# Patient Record
Sex: FEMALE | Race: BLACK OR AFRICAN AMERICAN | Employment: UNEMPLOYED | URBAN - METROPOLITAN AREA
[De-identification: names, ages, dates, MRNs, and addresses within clinical notes are randomized per-mention and may not be internally consistent; named-entity substitution may affect disease eponyms.]

---

## 2022-11-10 ENCOUNTER — ANESTHESIA EVENT (OUTPATIENT)
Dept: LABOR AND DELIVERY | Age: 27
End: 2022-11-10
Payer: COMMERCIAL

## 2022-11-10 ENCOUNTER — ANESTHESIA (OUTPATIENT)
Dept: LABOR AND DELIVERY | Age: 27
End: 2022-11-10
Payer: COMMERCIAL

## 2022-11-10 ENCOUNTER — HOSPITAL ENCOUNTER (OUTPATIENT)
Age: 27
Discharge: HOME | End: 2022-11-10
Attending: OBSTETRICS & GYNECOLOGY | Admitting: OBSTETRICS & GYNECOLOGY
Payer: COMMERCIAL

## 2022-11-10 VITALS
OXYGEN SATURATION: 100 % | HEART RATE: 68 BPM | RESPIRATION RATE: 18 BRPM | SYSTOLIC BLOOD PRESSURE: 103 MMHG | DIASTOLIC BLOOD PRESSURE: 58 MMHG | HEIGHT: 70 IN | BODY MASS INDEX: 23.91 KG/M2 | WEIGHT: 167 LBS | TEMPERATURE: 98 F

## 2022-11-10 DIAGNOSIS — O02.1 MISSED ABORTION: ICD-10-CM

## 2022-11-10 DIAGNOSIS — G89.18 ACUTE POST-OPERATIVE PAIN: Primary | ICD-10-CM

## 2022-11-10 LAB
ABO/RH: NORMAL
ANTIBODY SCREEN: NORMAL
BASOPHILS ABSOLUTE: 0 K/UL (ref 0–0.2)
BASOPHILS RELATIVE PERCENT: 0.6 %
EOSINOPHILS ABSOLUTE: 0.1 K/UL (ref 0–0.6)
EOSINOPHILS RELATIVE PERCENT: 1.6 %
HCT VFR BLD CALC: 39.9 % (ref 36–48)
HEMOGLOBIN: 12.8 G/DL (ref 12–16)
LYMPHOCYTES ABSOLUTE: 3.4 K/UL (ref 1–5.1)
LYMPHOCYTES RELATIVE PERCENT: 48.5 %
MCH RBC QN AUTO: 26.3 PG (ref 26–34)
MCHC RBC AUTO-ENTMCNC: 32.2 G/DL (ref 31–36)
MCV RBC AUTO: 81.6 FL (ref 80–100)
MONOCYTES ABSOLUTE: 0.6 K/UL (ref 0–1.3)
MONOCYTES RELATIVE PERCENT: 7.8 %
NEUTROPHILS ABSOLUTE: 2.9 K/UL (ref 1.7–7.7)
NEUTROPHILS RELATIVE PERCENT: 41.5 %
PDW BLD-RTO: 14 % (ref 12.4–15.4)
PLATELET # BLD: 291 K/UL (ref 135–450)
PMV BLD AUTO: 8.5 FL (ref 5–10.5)
RBC # BLD: 4.89 M/UL (ref 4–5.2)
WBC # BLD: 7.1 K/UL (ref 4–11)

## 2022-11-10 PROCEDURE — 2580000003 HC RX 258: Performed by: OBSTETRICS & GYNECOLOGY

## 2022-11-10 PROCEDURE — 3700000001 HC ADD 15 MINUTES (ANESTHESIA): Performed by: OBSTETRICS & GYNECOLOGY

## 2022-11-10 PROCEDURE — 6360000002 HC RX W HCPCS: Performed by: ANESTHESIOLOGY

## 2022-11-10 PROCEDURE — 86901 BLOOD TYPING SEROLOGIC RH(D): CPT

## 2022-11-10 PROCEDURE — 2709999900 HC NON-CHARGEABLE SUPPLY: Performed by: OBSTETRICS & GYNECOLOGY

## 2022-11-10 PROCEDURE — 86850 RBC ANTIBODY SCREEN: CPT

## 2022-11-10 PROCEDURE — 2580000003 HC RX 258: Performed by: ANESTHESIOLOGY

## 2022-11-10 PROCEDURE — 3600000012 HC SURGERY LEVEL 2 ADDTL 15MIN: Performed by: OBSTETRICS & GYNECOLOGY

## 2022-11-10 PROCEDURE — 6360000002 HC RX W HCPCS: Performed by: NURSE ANESTHETIST, CERTIFIED REGISTERED

## 2022-11-10 PROCEDURE — 86900 BLOOD TYPING SEROLOGIC ABO: CPT

## 2022-11-10 PROCEDURE — 6370000000 HC RX 637 (ALT 250 FOR IP): Performed by: ANESTHESIOLOGY

## 2022-11-10 PROCEDURE — 2500000003 HC RX 250 WO HCPCS: Performed by: OBSTETRICS & GYNECOLOGY

## 2022-11-10 PROCEDURE — 51701 INSERT BLADDER CATHETER: CPT

## 2022-11-10 PROCEDURE — 7100000001 HC PACU RECOVERY - ADDTL 15 MIN: Performed by: OBSTETRICS & GYNECOLOGY

## 2022-11-10 PROCEDURE — 3700000000 HC ANESTHESIA ATTENDED CARE: Performed by: OBSTETRICS & GYNECOLOGY

## 2022-11-10 PROCEDURE — 2500000003 HC RX 250 WO HCPCS: Performed by: ANESTHESIOLOGY

## 2022-11-10 PROCEDURE — 88305 TISSUE EXAM BY PATHOLOGIST: CPT

## 2022-11-10 PROCEDURE — 7100000000 HC PACU RECOVERY - FIRST 15 MIN: Performed by: OBSTETRICS & GYNECOLOGY

## 2022-11-10 PROCEDURE — 85025 COMPLETE CBC W/AUTO DIFF WBC: CPT

## 2022-11-10 PROCEDURE — A4216 STERILE WATER/SALINE, 10 ML: HCPCS | Performed by: ANESTHESIOLOGY

## 2022-11-10 PROCEDURE — 3600000002 HC SURGERY LEVEL 2 BASE: Performed by: OBSTETRICS & GYNECOLOGY

## 2022-11-10 PROCEDURE — 2500000003 HC RX 250 WO HCPCS: Performed by: NURSE ANESTHETIST, CERTIFIED REGISTERED

## 2022-11-10 RX ORDER — SODIUM CHLORIDE, SODIUM LACTATE, POTASSIUM CHLORIDE, CALCIUM CHLORIDE 600; 310; 30; 20 MG/100ML; MG/100ML; MG/100ML; MG/100ML
INJECTION, SOLUTION INTRAVENOUS CONTINUOUS
Status: DISCONTINUED | OUTPATIENT
Start: 2022-11-10 | End: 2022-11-10 | Stop reason: HOSPADM

## 2022-11-10 RX ORDER — SODIUM CHLORIDE, SODIUM LACTATE, POTASSIUM CHLORIDE, AND CALCIUM CHLORIDE .6; .31; .03; .02 G/100ML; G/100ML; G/100ML; G/100ML
1000 INJECTION, SOLUTION INTRAVENOUS ONCE
Status: COMPLETED | OUTPATIENT
Start: 2022-11-10 | End: 2022-11-10

## 2022-11-10 RX ORDER — FENTANYL CITRATE 50 UG/ML
INJECTION, SOLUTION INTRAMUSCULAR; INTRAVENOUS PRN
Status: DISCONTINUED | OUTPATIENT
Start: 2022-11-10 | End: 2022-11-10 | Stop reason: SDUPTHER

## 2022-11-10 RX ORDER — KETOROLAC TROMETHAMINE 30 MG/ML
INJECTION, SOLUTION INTRAMUSCULAR; INTRAVENOUS PRN
Status: DISCONTINUED | OUTPATIENT
Start: 2022-11-10 | End: 2022-11-10 | Stop reason: SDUPTHER

## 2022-11-10 RX ORDER — HYDROCODONE BITARTRATE AND ACETAMINOPHEN 5; 325 MG/1; MG/1
1 TABLET ORAL EVERY 6 HOURS PRN
Qty: 12 TABLET | Refills: 0 | Status: SHIPPED | OUTPATIENT
Start: 2022-11-10 | End: 2022-11-13

## 2022-11-10 RX ORDER — SODIUM CHLORIDE 0.9 % (FLUSH) 0.9 %
5-40 SYRINGE (ML) INJECTION PRN
Status: DISCONTINUED | OUTPATIENT
Start: 2022-11-10 | End: 2022-11-10 | Stop reason: HOSPADM

## 2022-11-10 RX ORDER — ACETAMINOPHEN 500 MG
1000 TABLET ORAL
Status: COMPLETED | OUTPATIENT
Start: 2022-11-10 | End: 2022-11-10

## 2022-11-10 RX ORDER — PROPOFOL 10 MG/ML
INJECTION, EMULSION INTRAVENOUS PRN
Status: DISCONTINUED | OUTPATIENT
Start: 2022-11-10 | End: 2022-11-10 | Stop reason: SDUPTHER

## 2022-11-10 RX ORDER — LIDOCAINE HYDROCHLORIDE 20 MG/ML
INJECTION, SOLUTION EPIDURAL; INFILTRATION; INTRACAUDAL; PERINEURAL PRN
Status: DISCONTINUED | OUTPATIENT
Start: 2022-11-10 | End: 2022-11-10 | Stop reason: SDUPTHER

## 2022-11-10 RX ORDER — DOCUSATE SODIUM 100 MG/1
100 CAPSULE, LIQUID FILLED ORAL DAILY PRN
Qty: 30 CAPSULE | Refills: 0 | Status: SHIPPED | OUTPATIENT
Start: 2022-11-10

## 2022-11-10 RX ORDER — FENTANYL CITRATE 50 UG/ML
50 INJECTION, SOLUTION INTRAMUSCULAR; INTRAVENOUS ONCE
Status: COMPLETED | OUTPATIENT
Start: 2022-11-10 | End: 2022-11-10

## 2022-11-10 RX ORDER — SODIUM CHLORIDE 9 MG/ML
INJECTION, SOLUTION INTRAVENOUS PRN
Status: DISCONTINUED | OUTPATIENT
Start: 2022-11-10 | End: 2022-11-10 | Stop reason: HOSPADM

## 2022-11-10 RX ORDER — MIDAZOLAM HYDROCHLORIDE 1 MG/ML
INJECTION INTRAMUSCULAR; INTRAVENOUS PRN
Status: DISCONTINUED | OUTPATIENT
Start: 2022-11-10 | End: 2022-11-10 | Stop reason: SDUPTHER

## 2022-11-10 RX ORDER — TRISODIUM CITRATE DIHYDRATE AND CITRIC ACID MONOHYDRATE 500; 334 MG/5ML; MG/5ML
30 SOLUTION ORAL
Status: COMPLETED | OUTPATIENT
Start: 2022-11-10 | End: 2022-11-10

## 2022-11-10 RX ORDER — ONDANSETRON 2 MG/ML
INJECTION INTRAMUSCULAR; INTRAVENOUS PRN
Status: DISCONTINUED | OUTPATIENT
Start: 2022-11-10 | End: 2022-11-10 | Stop reason: SDUPTHER

## 2022-11-10 RX ORDER — SODIUM CHLORIDE 0.9 % (FLUSH) 0.9 %
5-40 SYRINGE (ML) INJECTION EVERY 12 HOURS SCHEDULED
Status: DISCONTINUED | OUTPATIENT
Start: 2022-11-10 | End: 2022-11-10 | Stop reason: HOSPADM

## 2022-11-10 RX ORDER — DEXAMETHASONE SODIUM PHOSPHATE 4 MG/ML
INJECTION, SOLUTION INTRA-ARTICULAR; INTRALESIONAL; INTRAMUSCULAR; INTRAVENOUS; SOFT TISSUE PRN
Status: DISCONTINUED | OUTPATIENT
Start: 2022-11-10 | End: 2022-11-10 | Stop reason: SDUPTHER

## 2022-11-10 RX ADMIN — FENTANYL CITRATE 50 MCG: 50 INJECTION INTRAMUSCULAR; INTRAVENOUS at 09:12

## 2022-11-10 RX ADMIN — SODIUM CHLORIDE, POTASSIUM CHLORIDE, SODIUM LACTATE AND CALCIUM CHLORIDE 1000 ML: 600; 310; 30; 20 INJECTION, SOLUTION INTRAVENOUS at 07:56

## 2022-11-10 RX ADMIN — ACETAMINOPHEN 1000 MG: 500 TABLET ORAL at 07:52

## 2022-11-10 RX ADMIN — SODIUM CHLORIDE, SODIUM LACTATE, POTASSIUM CHLORIDE, AND CALCIUM CHLORIDE: .6; .31; .03; .02 INJECTION, SOLUTION INTRAVENOUS at 08:57

## 2022-11-10 RX ADMIN — HYDROMORPHONE HYDROCHLORIDE 1 MG: 1 INJECTION, SOLUTION INTRAMUSCULAR; INTRAVENOUS; SUBCUTANEOUS at 12:04

## 2022-11-10 RX ADMIN — MIDAZOLAM 1 MG: 1 INJECTION INTRAMUSCULAR; INTRAVENOUS at 08:57

## 2022-11-10 RX ADMIN — LIDOCAINE HYDROCHLORIDE 50 MG: 20 INJECTION, SOLUTION EPIDURAL; INFILTRATION; INTRACAUDAL; PERINEURAL at 09:07

## 2022-11-10 RX ADMIN — SODIUM CITRATE AND CITRIC ACID MONOHYDRATE 30 ML: 500; 334 SOLUTION ORAL at 07:53

## 2022-11-10 RX ADMIN — KETOROLAC TROMETHAMINE 30 MG: 30 INJECTION, SOLUTION INTRAMUSCULAR at 09:50

## 2022-11-10 RX ADMIN — FAMOTIDINE 20 MG: 10 INJECTION, SOLUTION INTRAVENOUS at 07:53

## 2022-11-10 RX ADMIN — ONDANSETRON 4 MG: 2 INJECTION INTRAMUSCULAR; INTRAVENOUS at 09:42

## 2022-11-10 RX ADMIN — DOXYCYCLINE 200 MG: 100 INJECTION, POWDER, LYOPHILIZED, FOR SOLUTION INTRAVENOUS at 08:00

## 2022-11-10 RX ADMIN — FENTANYL CITRATE 50 MCG: 50 INJECTION INTRAMUSCULAR; INTRAVENOUS at 09:00

## 2022-11-10 RX ADMIN — FENTANYL CITRATE 50 MCG: 50 INJECTION, SOLUTION INTRAMUSCULAR; INTRAVENOUS at 10:46

## 2022-11-10 RX ADMIN — PROPOFOL 200 MG: 10 INJECTION, EMULSION INTRAVENOUS at 09:07

## 2022-11-10 RX ADMIN — DEXAMETHASONE SODIUM PHOSPHATE 8 MG: 4 INJECTION, SOLUTION INTRAMUSCULAR; INTRAVENOUS at 09:20

## 2022-11-10 ASSESSMENT — PAIN - FUNCTIONAL ASSESSMENT: PAIN_FUNCTIONAL_ASSESSMENT: ACTIVITIES ARE NOT PREVENTED

## 2022-11-10 ASSESSMENT — PAIN DESCRIPTION - ORIENTATION: ORIENTATION: LOWER

## 2022-11-10 ASSESSMENT — PAIN DESCRIPTION - DESCRIPTORS
DESCRIPTORS: CRAMPING
DESCRIPTORS: CRAMPING;SHOOTING

## 2022-11-10 ASSESSMENT — PAIN SCALES - GENERAL
PAINLEVEL_OUTOF10: 7
PAINLEVEL_OUTOF10: 7

## 2022-11-10 ASSESSMENT — PAIN DESCRIPTION - LOCATION: LOCATION: ABDOMEN

## 2022-11-10 ASSESSMENT — ENCOUNTER SYMPTOMS: SHORTNESS OF BREATH: 0

## 2022-11-10 NOTE — FLOWSHEET NOTE
Pt admitted to [facility] for scheduled D&C procedure . Pt and family oriented to room, call light, and binder. Whiteboard updated, gown and urine collection container given. Pt denies vaginal leakage of fluid or bleeding. IV started, infusing w/o difficulty, labs sent. Admission history obtained and appropriate consents reviewed and signed.

## 2022-11-10 NOTE — PLAN OF CARE
Problem: Discharge Planning  Goal: Discharge to home or other facility with appropriate resources  Outcome: Progressing     Problem: Pain  Goal: Verbalizes/displays adequate comfort level or baseline comfort level  Outcome: Progressing     Problem: Postpartum  Goal: Incisions, wounds, or drain sites healing without S/S of infection  Outcome: Progressing     Problem: Infection - Adult  Goal: Absence of infection at discharge  Outcome: Progressing  Goal: Absence of infection during hospitalization  Outcome: Progressing  Goal: Absence of fever/infection during anticipated neutropenic period  Outcome: Progressing     Problem: Safety - Adult  Goal: Free from fall injury  Outcome: Progressing

## 2022-11-10 NOTE — FLOWSHEET NOTE
Patient complains of shooting lower back pain.  Patient rates that pain 7/10 despite recent does of fentanyl

## 2022-11-10 NOTE — FLOWSHEET NOTE
Anora box packaged and given to  of patient and instructed to take to [facility] for shipping.

## 2022-11-10 NOTE — ANESTHESIA POSTPROCEDURE EVALUATION
Department of Anesthesiology  Postprocedure Note    Patient: Maudie Phoenix  MRN: [id number]  YOB: 1995  Date of evaluation: 11/10/2022      Procedure Summary     Date: 11/10/22 Room / Location: [facility]D OR [address] [place] [address]    Anesthesia Start: 2796 Anesthesia Stop: 1007    Procedure: DILATATION AND CURETTAGE SUCTION with  Juan Rangel MD Diagnosis:       Missed       (Missed  [O02.1])    Surgeons: Juan Rangel MD Responsible Provider: Jyoti Klein MD    Anesthesia Type: general ASA Status: 1          Anesthesia Type: No value filed.     Felix Phase I: Felix Score: 10    Felix Phase II:        Anesthesia Post Evaluation    Patient location during evaluation: bedside  Patient participation: complete - patient participated  Level of consciousness: awake and alert  Pain score: 3  Airway patency: patent  Nausea & Vomiting: no nausea and no vomiting  Complications: no  Cardiovascular status: hemodynamically stable  Respiratory status: room air and acceptable  Hydration status: stable  Multimodal analgesia pain management approach    BP (!) 103/58   Pulse 68   Temp 36.7 °C (98 °F) (Oral)   Resp 18   Ht 5' 10\" (1.778 m)   Wt 167 lb (75.8 kg)   SpO2 100%   BMI 23.96 kg/m²

## 2022-11-10 NOTE — FLOWSHEET NOTE
Tess[employer]'s gift charm with cord poem and November beaded charm given to pt. Emotional support given, pt and  calm and appropriate.

## 2022-11-10 NOTE — PLAN OF CARE
Problem: Discharge Planning  Goal: Discharge to home or other facility with appropriate resources  11/10/2022 1428 by [facility] Route, RN  Outcome: Completed  11/10/2022 1033 by [facility] Route, RN  Outcome: Progressing     Problem: Pain  Goal: Verbalizes/displays adequate comfort level or baseline comfort level  11/10/2022 1428 by [facility] Route, RN  Outcome: Completed  11/10/2022 1033 by [facility] Route, RN  Outcome: Progressing     Problem: Postpartum  Goal: Incisions, wounds, or drain sites healing without S/S of infection  11/10/2022 1428 by [facility] Route, RN  Outcome: Completed  11/10/2022 1033 by [facility] Route, RN  Outcome: Progressing     Problem: Infection - Adult  Goal: Absence of infection at discharge  11/10/2022 1428 by [facility] Route, RN  Outcome: Completed  11/10/2022 1033 by [facility] Route, RN  Outcome: Progressing  Goal: Absence of infection during hospitalization  11/10/2022 1428 by [facility] Route, RN  Outcome: Completed  11/10/2022 1033 by [facility] Route, RN  Outcome: Progressing  Goal: Absence of fever/infection during anticipated neutropenic period  11/10/2022 1428 by [facility] Route, RN  Outcome: Completed  11/10/2022 1033 by [facility] Route, RN  Outcome: Progressing     Problem: Safety - Adult  Goal: Free from fall injury  11/10/2022 1428 by [facility] Route, RN  Outcome: Completed  11/10/2022 1033 by [facility] Route, RN  Outcome: Progressing

## 2022-11-10 NOTE — FLOWSHEET NOTE
Pt doing well and ready for discharge. Discharge instructions reviewed with pt. Patient aware of when to follow up with provider in 1 month and all questions answered. Pt verbalized understanding, discharge papers signed by pt and RN. Scripts sent to preferred pharmacy , IV removed at this time- pt tolerated well. [employer] Gift charm and November bracelet given to pt and significant other, emotional support given at this time. Patient discharged via wheelchair with RN and  to vehicle.

## 2022-11-10 NOTE — H&P
Department of Gynecology History and Physical      CHIEF COMPLAINT: Missed     HISTORY OF PRESENT ILLNESS:    The patient is a 28 y.o. Elena [facility] female with significant past medical history of missed  who presents for suction D&C. Past Medical History:    History reviewed. No pertinent past medical history. Past Surgical History:    History reviewed. No pertinent surgical history. Meds:  Current Facility-Administered Medications:     lactated ringers infusion, , IntraVENous, Continuous, Giancarlo Richard MD    lactated ringers bolus, 1,000 mL, IntraVENous, Once, Giancarlo Richard MD, Last Rate: 983.6 mL/hr at 11/10/22 0756, 1,000 mL at 11/10/22 0756    doxycycline (VIBRAMYCIN) 200 mg in dextrose 5 % 250 mL IVPB, 200 mg, IntraVENous, Once, Giancarlo Richard MD     Allergies:  Patient has no known allergies.      Social History:  Social History     Socioeconomic History    Marital status:      Spouse name: None    Number of children: None    Years of education: None    Highest education level: None   Tobacco Use    Smoking status: Never     Passive exposure: Never    Smokeless tobacco: Never   Vaping Use    Vaping Use: Never used   Substance and Sexual Activity    Alcohol use: Never    Drug use: Never    Sexual activity: Never         Family History:       Problem Relation Age of Onset    Diabetes Mother     Diabetes Father        ROS:     General ROS: negative  Respiratory ROS: no cough, SOB or wheezing  Cardiovascular ROS: no chest pain or dyspnea on exertion  Gastrointestinal ROS: no abdominal pain, change in bowel habits, or black or bloody stools  Genito-Urinary ROS: no dysuria, trouble of voiding or hematuria  Musculoskeletal ROS: negative    PHYSICAL EXAM:    Vitals:  /89   Pulse 88   Temp 98.1 °F (36.7 °C) (Oral)   Resp 18   Ht 5' 10\" (1.778 m)   Wt 167 lb (75.8 kg)   SpO2 98%   BMI 23.96 kg/m²     CONSTITUTIONAL:  awake, alert, cooperative, no apparent distress, and appears stated age  NECK:  Supple, symmetrical, trachea midline, no adenopathy, thyroid symmetric, not enlarged and no tenderness, skin normal  HEMATOLOGIC/LYMPHATICS:  no cervical lymphadenopathy  BACK:  symmetric  LUNGS:  No increased work of breathing, good air exchange  CARDIOVASCULAR: No periferal edema  ABDOMEN:  Soft, non-distended, non-tender, no masses palpated, no hepatosplenomegally  MUSCULOSKELETAL:  tone is normal  NEUROLOGIC:  Awake, alert, oriented to name, place and time. SKIN:  normal skin color, texture, turgor      Labs:  CBC:   Lab Results   Component Value Date/Time    WBC 7.1 11/10/2022 07:45 AM    RBC 4.89 11/10/2022 07:45 AM    HGB 12.8 11/10/2022 07:45 AM    HCT 39.9 11/10/2022 07:45 AM    MCV 81.6 11/10/2022 07:45 AM    RDW 14.0 11/10/2022 07:45 AM     11/10/2022 07:45 AM     Imaging: not performed    ASSESSMENT AND PLAN:      Missed     - to OR for suction D&C  - procedure reviewed in the office and again today. Risks include but are not limited to bleeding, infection, damage to surrounding tissue and organs necessitating further surgeries not listed. I have presented reasonable alternatives to the patient's proposed care, treatment, and services. The discussion I have done encompassed risks, benefits, and side effects related to the alternatives and the risks related to not receiving the proposed care, treatment, and services. All questions answered. Patient wishes to proceed. The surgical site was confirmed by the patient and me.      Electronically signed by Briana Scott MD on 11/10/2022 at 8:37 AM

## 2022-11-10 NOTE — OP NOTE
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 The risks of the procedure were reviewed with the patient in details . They included but were not limited to risk of uterine perforation, that might require additional procedure, risk of bleeding and blood transfusion which carries risk for HIV or hepatitis, allergic reaction, risk of infection, risk of adhesive disease or scar formation in the uterus with subsequent infertility, risk of blood clot/embolism, risk of anesthesia, cardiac arrest, and remote risk of maternal death. Patient accepted the risks and elected to proceed with procedure. Description of Procedure: The patient was taken to the Operating Room where anesthesia was found to be adequate. Exam under anesthesia revealed anteverted uterus enlarged to 9 wks. The patient was then placed in the dorsal lithotomy position and prepped and draped in the usual sterile fashion. A straight catheter was used to drain the urine from the bladder. Time out was performed, identifying correct patients name, date of birth, and type of the procedure. The cervix and distal vagina were visualized. The cervix was not dilated. A single-toothed tenaculum clamp was used to grasp the anterior lip of the cervix. Suction curette 7 mm size was passed approximately 5 times gently, removing products of conception. A sharp curettage was performed under ultrasound guidance and then a final pass with the suction curette.  Bleeding in the canister was significant but there was never active bleeding from the cervix in between curettage. The single-toothed tenaculum was removed and sites were hemostatic. All instruments and retractors were removed from vagina. A vaginal sweep was done. All sponge, instrument and needle counts were noted to be correct.  The patient tolerated procedure well and was taken to recovery in stable

## 2022-11-10 NOTE — ANESTHESIA PRE PROCEDURE
Department of Anesthesiology  Preprocedure Note       Name:  Damon Drake   Age:  28 y.o.  :  1995                                          MRN:  [id number]         Date:  11/10/2022      Surgeon: Susana Jordan):  Ace Paz MD    Procedure: Procedure(s):  DILATATION AND CURETTAGE SUCTION    Medications prior to admission:   Prior to Admission medications    Not on File       Current medications:    Current Facility-Administered Medications   Medication Dose Route Frequency Provider Last Rate Last Admin    lactated ringers infusion   IntraVENous Continuous Ace Paz MD        lactated ringers bolus  1,000 mL IntraVENous Once Ace Paz .6 mL/hr at 11/10/22 0756 1,000 mL at 11/10/22 0756    doxycycline (VIBRAMYCIN) 200 mg in dextrose 5 % 250 mL IVPB  200 mg IntraVENous Once Ace Paz MD           Allergies:  No Known Allergies    Problem List:    Patient Active Problem List   Diagnosis Code    Missed  O02.1       Past Medical History:  History reviewed. No pertinent past medical history. Past Surgical History:  History reviewed. No pertinent surgical history. Social History:    Social History     Tobacco Use    Smoking status: Never     Passive exposure: Never    Smokeless tobacco: Never   Substance Use Topics    Alcohol use: Never                                Counseling given: No      Vital Signs (Current):   Vitals:    11/10/22 0723 11/10/22 0738   BP: 118/89    Pulse: 88 88   Resp: 18    Temp: 36.7 °C (98.1 °F)    TempSrc: Oral    SpO2: 98%    Weight:  167 lb (75.8 kg)   Height:  5' 10\" (1.778 m)                                              BP Readings from Last 3 Encounters:   11/10/22 118/89       NPO Status:                           2330 on 22                                                      BMI:   Wt Readings from Last 3 Encounters:   11/10/22 167 lb (75.8 kg)     Body mass index is 23.96 kg/m².     CBC:   Lab Results   Component Value Date/Time    WBC 7.1 11/10/2022 07:45 AM    RBC 4.89 11/10/2022 07:45 AM    HGB 12.8 11/10/2022 07:45 AM    HCT 39.9 11/10/2022 07:45 AM    MCV 81.6 11/10/2022 07:45 AM    RDW 14.0 11/10/2022 07:45 AM     11/10/2022 07:45 AM         Anesthesia Evaluation  Patient summary reviewed and Nursing notes reviewed no history of anesthetic complications:   Airway: Mallampati: II  TM distance: >3 FB   Neck ROM: full  Mouth opening: > = 3 FB   Dental: normal exam         Pulmonary:Negative Pulmonary ROS and normal exam  breath sounds clear to auscultation      (-) asthma, shortness of breath and recent URI                           Cardiovascular:Negative CV ROS  Exercise tolerance: good (>4 METS),       (-) hypertension and CAD      Rhythm: regular  Rate: normal                    Neuro/Psych:   Negative Neuro/Psych ROS              GI/Hepatic/Renal: Neg GI/Hepatic/Renal ROS       (-) GERD, liver disease and no renal disease       Endo/Other: Negative Endo/Other ROS       (-) diabetes mellitus               Abdominal:             Vascular: negative vascular ROS. - DVT and PE. Other Findings:           Anesthesia Plan      general     ASA 1     (GETA - PIV. Multimodal analgesics for pain control. PONV prophylaxis prn. R&B, POC discussed with patient - questions answered. Patient agrees to [address] PACU post-op)        Anesthetic plan and risks discussed with patient and spouse. Plan discussed with attending and surgical team.    Attending anesthesiologist reviewed and agrees with Preprocedure content          OB History        1    Para        Term                AB   1    Living           SAB   1    IAB        Ectopic        Molar        Multiple        Live Births                    Vandana Quick is a 28y.o. year-old female admitted to Pranav Varghese MD for [facility] . Her Body mass index is 23.96 kg/m².      She was seen, examined and her chart was reviewed (including anesthesia, drug and allergy history). No interval changes are noted to her history and physical examination. (except as noted above).     Risks/benefits/alternatives of both neuraxial and general anesthesia were discussed and she agrees to proceed at the direction of the care team.    Esa Langley, GERA - SCOTT  November 10, 2022  8:09 AM

## 2022-11-10 NOTE — FLOWSHEET NOTE
Dr. Gibran Ariza and CRNA at bedside to address shooting pains in lower back. Pain appears to be gas pain. Patient will try and go to bathroom. Patient states that she has a bowel movement every 2 days.

## 2022-11-16 ENCOUNTER — HOSPITAL ENCOUNTER (EMERGENCY)
Age: 27
Discharge: HOME | End: 2022-11-16
Payer: COMMERCIAL

## 2022-11-16 ENCOUNTER — APPOINTMENT (OUTPATIENT)
Dept: CT IMAGING | Age: 27
End: 2022-11-16
Payer: COMMERCIAL

## 2022-11-16 VITALS
OXYGEN SATURATION: 100 % | BODY MASS INDEX: 23.96 KG/M2 | HEART RATE: 78 BPM | DIASTOLIC BLOOD PRESSURE: 72 MMHG | RESPIRATION RATE: 14 BRPM | SYSTOLIC BLOOD PRESSURE: 114 MMHG | TEMPERATURE: 98.2 F | HEIGHT: 70 IN

## 2022-11-16 DIAGNOSIS — N93.9 VAGINAL BLEEDING: ICD-10-CM

## 2022-11-16 DIAGNOSIS — R10.30 LOWER ABDOMINAL PAIN: Primary | ICD-10-CM

## 2022-11-16 LAB
A/G RATIO: 1.2 (ref 1.1–2.2)
ALBUMIN SERPL-MCNC: 4.2 G/DL (ref 3.4–5)
ALP BLD-CCNC: 65 U/L (ref 40–129)
ALT SERPL-CCNC: 15 U/L (ref 10–40)
ANION GAP SERPL CALCULATED.3IONS-SCNC: 12 MMOL/L (ref 3–16)
AST SERPL-CCNC: 21 U/L (ref 15–37)
BACTERIA: ABNORMAL /HPF
BASOPHILS ABSOLUTE: 0 K/UL (ref 0–0.2)
BASOPHILS RELATIVE PERCENT: 0.7 %
BILIRUB SERPL-MCNC: 0.3 MG/DL (ref 0–1)
BILIRUBIN URINE: NEGATIVE
BLOOD, URINE: ABNORMAL
BUN BLDV-MCNC: 9 MG/DL (ref 7–20)
CALCIUM SERPL-MCNC: 9.4 MG/DL (ref 8.3–10.6)
CHLORIDE BLD-SCNC: 103 MMOL/L (ref 99–110)
CLARITY: CLEAR
CO2: 24 MMOL/L (ref 21–32)
COLOR: YELLOW
CREAT SERPL-MCNC: 0.5 MG/DL (ref 0.6–1.1)
EOSINOPHILS ABSOLUTE: 0.2 K/UL (ref 0–0.6)
EOSINOPHILS RELATIVE PERCENT: 3.6 %
EPITHELIAL CELLS, UA: 6 /HPF (ref 0–5)
GFR SERPL CREATININE-BSD FRML MDRD: >60 ML/MIN/{1.73_M2}
GLUCOSE BLD-MCNC: 81 MG/DL (ref 70–99)
GLUCOSE URINE: NEGATIVE MG/DL
HCG QUALITATIVE: POSITIVE
HCT VFR BLD CALC: 37.2 % (ref 36–48)
HEMOGLOBIN: 11.8 G/DL (ref 12–16)
HYALINE CASTS: 0 /LPF (ref 0–8)
KETONES, URINE: NEGATIVE MG/DL
LEUKOCYTE ESTERASE, URINE: ABNORMAL
LYMPHOCYTES ABSOLUTE: 2.1 K/UL (ref 1–5.1)
LYMPHOCYTES RELATIVE PERCENT: 36 %
MCH RBC QN AUTO: 26.3 PG (ref 26–34)
MCHC RBC AUTO-ENTMCNC: 31.6 G/DL (ref 31–36)
MCV RBC AUTO: 83 FL (ref 80–100)
MICROSCOPIC EXAMINATION: YES
MONOCYTES ABSOLUTE: 0.6 K/UL (ref 0–1.3)
MONOCYTES RELATIVE PERCENT: 9.8 %
NEUTROPHILS ABSOLUTE: 2.9 K/UL (ref 1.7–7.7)
NEUTROPHILS RELATIVE PERCENT: 49.9 %
NITRITE, URINE: NEGATIVE
PDW BLD-RTO: 15.1 % (ref 12.4–15.4)
PH UA: 7 (ref 5–8)
PLATELET # BLD: 309 K/UL (ref 135–450)
PMV BLD AUTO: 8.9 FL (ref 5–10.5)
POTASSIUM SERPL-SCNC: 4 MMOL/L (ref 3.5–5.1)
PROTEIN UA: ABNORMAL MG/DL
RBC # BLD: 4.49 M/UL (ref 4–5.2)
RBC UA: 22 /HPF (ref 0–4)
SODIUM BLD-SCNC: 139 MMOL/L (ref 136–145)
SPECIFIC GRAVITY UA: 1.02 (ref 1–1.03)
TOTAL PROTEIN: 7.8 G/DL (ref 6.4–8.2)
URINE REFLEX TO CULTURE: ABNORMAL
URINE TYPE: ABNORMAL
UROBILINOGEN, URINE: 1 E.U./DL
WBC # BLD: 5.9 K/UL (ref 4–11)
WBC UA: 1 /HPF (ref 0–5)

## 2022-11-16 PROCEDURE — 6360000004 HC RX CONTRAST MEDICATION: Performed by: PHYSICIAN ASSISTANT

## 2022-11-16 PROCEDURE — 80053 COMPREHEN METABOLIC PANEL: CPT

## 2022-11-16 PROCEDURE — 74177 CT ABD & PELVIS W/CONTRAST: CPT

## 2022-11-16 PROCEDURE — 81001 URINALYSIS AUTO W/SCOPE: CPT

## 2022-11-16 PROCEDURE — 99285 EMERGENCY DEPT VISIT HI MDM: CPT

## 2022-11-16 PROCEDURE — 6370000000 HC RX 637 (ALT 250 FOR IP): Performed by: PHYSICIAN ASSISTANT

## 2022-11-16 PROCEDURE — 84703 CHORIONIC GONADOTROPIN ASSAY: CPT

## 2022-11-16 PROCEDURE — 85025 COMPLETE CBC W/AUTO DIFF WBC: CPT

## 2022-11-16 RX ORDER — HYDROCODONE BITARTRATE AND ACETAMINOPHEN 5; 325 MG/1; MG/1
1 TABLET ORAL EVERY 6 HOURS PRN
Qty: 10 TABLET | Refills: 0 | Status: SHIPPED | OUTPATIENT
Start: 2022-11-16 | End: 2022-11-19

## 2022-11-16 RX ORDER — ONDANSETRON 4 MG/1
8 TABLET, ORALLY DISINTEGRATING ORAL ONCE
Status: COMPLETED | OUTPATIENT
Start: 2022-11-16 | End: 2022-11-16

## 2022-11-16 RX ORDER — IBUPROFEN 400 MG/1
800 TABLET ORAL ONCE
Status: COMPLETED | OUTPATIENT
Start: 2022-11-16 | End: 2022-11-16

## 2022-11-16 RX ORDER — ACETAMINOPHEN 500 MG
1000 TABLET ORAL ONCE
Status: COMPLETED | OUTPATIENT
Start: 2022-11-16 | End: 2022-11-16

## 2022-11-16 RX ADMIN — IBUPROFEN 800 MG: 400 TABLET, FILM COATED ORAL at 14:16

## 2022-11-16 RX ADMIN — IOPAMIDOL 75 ML: 755 INJECTION, SOLUTION INTRAVENOUS at 15:29

## 2022-11-16 RX ADMIN — ACETAMINOPHEN 1000 MG: 500 TABLET ORAL at 14:16

## 2022-11-16 RX ADMIN — ONDANSETRON 8 MG: 4 TABLET, ORALLY DISINTEGRATING ORAL at 14:16

## 2022-11-16 ASSESSMENT — PAIN SCALES - GENERAL
PAINLEVEL_OUTOF10: 0
PAINLEVEL_OUTOF10: 5
PAINLEVEL_OUTOF10: 6

## 2022-11-16 ASSESSMENT — ENCOUNTER SYMPTOMS
EYE PAIN: 0
ABDOMINAL PAIN: 1
BACK PAIN: 0
NAUSEA: 0
COUGH: 0
SHORTNESS OF BREATH: 0
VOMITING: 0
SORE THROAT: 0

## 2022-11-16 ASSESSMENT — PAIN DESCRIPTION - PAIN TYPE: TYPE: ACUTE PAIN

## 2022-11-16 ASSESSMENT — PAIN DESCRIPTION - LOCATION
LOCATION: ABDOMEN
LOCATION: ABDOMEN

## 2022-11-16 ASSESSMENT — PAIN DESCRIPTION - DESCRIPTORS: DESCRIPTORS: DULL

## 2022-11-16 ASSESSMENT — PAIN DESCRIPTION - FREQUENCY: FREQUENCY: CONTINUOUS

## 2022-11-16 ASSESSMENT — PAIN - FUNCTIONAL ASSESSMENT
PAIN_FUNCTIONAL_ASSESSMENT: 0-10
PAIN_FUNCTIONAL_ASSESSMENT: 0-10

## 2022-11-16 NOTE — ED PROVIDER NOTES
**ADVANCED PRACTICE PROVIDER, I HAVE EVALUATED THIS PATIENT**        [address]      Pt Name: Earnest Bonds  [facility]:[id number]  [place] 1995  Date of evaluation: 11/16/2022  Provider: Nereyda Mcdonough PA-C  Note Started: 6:12 PM EST 11/16/2022        Chief Complaint:    Chief Complaint   Patient presents with    Abdominal Pain     Pt arrives ambulatory for eval of abdominal pain post dnc last week. Pt sts nausea present and bleeding has increased the past 2 days         Nursing Notes, Past Medical Hx, Past Surgical Hx, Social Hx, Allergies, and Family Hx were all reviewed and agreed with or any disagreements were addressed in the HPI.    HPI: (Location, Duration, Timing, Severity, Quality, Assoc Sx, Context, Modifying factors)    Chief Complaint of abdominal pain. Patient complain of lower abdominal pain. She had a D&C last week. She complain of some slight vaginal bleeding. Said she went through about 2 pads per day. Denies fever. No upper abdominal pain. No back pain. Not passing any clots. Does not appear to be in acute distress. No nausea vomiting. This is a  28 y.o. female who presents to the emergency room with the above complaint. PastMedical/Surgical History:  History reviewed. No pertinent past medical history. Procedure Laterality Date    DILATION AND CURETTAGE OF UTERUS         Medications:  Previous Medications    DOCUSATE SODIUM (COLACE) 100 MG CAPSULE    Take 1 capsule by mouth daily as needed for Constipation         Review of Systems:  (2-9 systems needed)  Review of Systems   Constitutional:  Negative for chills and fever. HENT:  Negative for congestion and sore throat. Eyes:  Negative for pain and visual disturbance. Respiratory:  Negative for cough and shortness of breath. Cardiovascular:  Negative for chest pain and leg swelling. Gastrointestinal:  Positive for abdominal pain.  Negative for nausea and vomiting. Genitourinary:  Negative for dysuria and frequency. Musculoskeletal:  Negative for back pain and neck pain. Skin:  Negative for rash and wound. Neurological:  Negative for dizziness and light-headedness. \"Positives and Pertinent negatives as per HPI\"    Physical Exam:  Physical Exam  Vitals and nursing note reviewed. Constitutional:       Appearance: She is well-developed. She is not diaphoretic. HENT:      Head: Normocephalic and atraumatic. Nose: Nose normal.   Eyes:      General:         Right eye: No discharge. Left eye: No discharge. Cardiovascular:      Rate and Rhythm: Normal rate and regular rhythm. Heart sounds: Normal heart sounds. No murmur heard. No friction rub. No gallop. Pulmonary:      Effort: Pulmonary effort is normal. No respiratory distress. Breath sounds: Normal breath sounds. No wheezing or rales. Chest:      Chest wall: No tenderness. Musculoskeletal:         General: Normal range of motion. Cervical back: Normal range of motion and neck supple. Skin:     General: Skin is warm and dry. Neurological:      Mental Status: She is alert and oriented to person, place, and time.    Psychiatric:         Behavior: Behavior normal.       MEDICAL DECISION MAKING    Vitals:    Vitals:    11/16/22 1330   BP: 117/79   Pulse: 85   Resp: 16   Temp: 97.9 °F (36.6 °C)   TempSrc: Oral   SpO2: 96%   Height: 5' 10\" (1.778 m)       LABS:  Labs Reviewed   CBC WITH AUTO DIFFERENTIAL - Abnormal; Notable for the following components:       Result Value    Hemoglobin 11.8 (*)     All other components within normal limits   COMPREHENSIVE METABOLIC PANEL - Abnormal; Notable for the following components:    Creatinine 0.5 (*)     All other components within normal limits   URINALYSIS WITH REFLEX TO CULTURE - Abnormal; Notable for the following components:    Blood, Urine LARGE (*)     Protein, UA TRACE (*)     Leukocyte Esterase, Urine TRACE (*) All other components within normal limits   MICROSCOPIC URINALYSIS - Abnormal; Notable for the following components:    RBC, UA 22 (*)     Epithelial Cells, UA 6 (*)     All other components within normal limits   HCG, SERUM, QUALITATIVE        Remainder of labs reviewed and were negative at this time or not returned at the time of this note. RADIOLOGY:   Non-plain film images such as CT, Ultrasound and MRI are read by the radiologist. Kentrell Mclaughlin PA-C have directly visualized the radiologic plain film image(s) with the below findings:      Interpretation per the Radiologist below, if available at the time of this note:    CT ABDOMEN PELVIS W IV CONTRAST Additional Contrast? None   Final Result   No acute abdominal or pelvic findings. CT ABDOMEN PELVIS W IV CONTRAST Additional Contrast? None    Result Date: 11/16/2022  EXAMINATION: CT OF THE ABDOMEN AND PELVIS WITH CONTRAST 11/16/2022 3:22 pm TECHNIQUE: CT of the abdomen and pelvis was performed with the administration of intravenous contrast. Multiplanar reformatted images are provided for review. Automated exposure control, iterative reconstruction, and/or weight based adjustment of the mA/kV was utilized to reduce the radiation dose to as low as reasonably achievable. COMPARISON: None. HISTORY: ORDERING SYSTEM PROVIDED HISTORY: Lower abdominal pain after D&C. TECHNOLOGIST PROVIDED HISTORY: Additional Contrast?->None Reason for exam:->Lower abdominal pain after D&C. Decision Support Exception - unselect if not a suspected or confirmed emergency medical condition->Emergency Medical Condition (MA) Reason for Exam: Lower abdominal pain after D&C FINDINGS: Lower Chest: Lung bases are clear Organs: Liver and gallbladder are unremarkable. Spleen and pancreas are unremarkable. Adrenal glands and kidneys are unremarkable GI/Bowel: The distal esophagus, stomach, and small bowel loops are unremarkable. Colonic loops are unremarkable.   Normal appendix in the right lower quadrant. Pelvis: Urinary bladder is normal.  The uterus appears mildly enlarged . Adnexal structures appear normal.  No free fluid or free air. Peritoneum/Retroperitoneum: Aorta is normal caliber. No lymphadenopathy Bones/Soft Tissues: No suspicious osseous lesions     No acute abdominal or pelvic findings. MEDICAL DECISION MAKING / ED COURSE:      PROCEDURES:   Procedures    None    Patient was given:  Medications   ondansetron (ZOFRAN-ODT) disintegrating tablet 8 mg (8 mg Oral Given 11/16/22 1416)   acetaminophen (TYLENOL) tablet 1,000 mg (1,000 mg Oral Given 11/16/22 1416)   ibuprofen (ADVIL;MOTRIN) tablet 800 mg (800 mg Oral Given 11/16/22 1416)   iopamidol (ISOVUE-370) 76 % injection 75 mL (75 mLs IntraVENous Given 11/16/22 1529)     Emergency room course: Patient on exam cardiovascular regular rhythm, lungs are clear. No wheeze, rales or rhonchi noted. Abdomen is soft. Unable to reproduce any tenderness with palpation. No rebound or guarding noted. No CVA or flank tenderness. No guarding noted. Nondistended. Full range of motion all extremity. Patient is alert oriented x4. Does not appear to be in acute distress. Lab results from today shows CBC within normal limits with a white count of 5.9. CMP unremarkable    Serum qualitative hCG is positive. Urinalysis shows trace leukocytes, negative for nitrites, show large blood, negative for ketones. Microscopically WBC 1, bacteria none seen, RBC of 22, epithelial cells 6. CT scan abdomen and pelvis showed no acute abdominal or pelvic abnormalities. At this time I did discuss patient CT results with her. I did offer her an ultrasound but she did not want to stay and have that done. She will follow back up with her OB GYN who did her D&C. Return for any worsening. She was given a few Norco for breakthrough pain. Advised her to take Tylenol Motrin first and then use Norco as needed.   She was okay with this plan. She will be discharged stable condition. The patient tolerated their visit well. I evaluated the patient. The physician was available for consultation as needed. The patient and / or the family were informed of the results of any tests, a time was given to answer questions, a plan was proposed and they agreed with plan. I am the Primary Clinician of Record. CLINICAL IMPRESSION:  1. Lower abdominal pain    2. Vaginal bleeding        DISPOSITION Decision To Discharge 11/16/2022 06:58:57 PM      PATIENT REFERRED TO:  [facility] Emergency Department  [address] [place] [address]  [phone]  Call   If symptoms worsen    DISCHARGE MEDICATIONS:  New Prescriptions    HYDROCODONE-ACETAMINOPHEN (NORCO) 5-325 MG PER TABLET    Take 1 tablet by mouth every 6 hours as needed for Pain for up to 3 days.        DISCONTINUED MEDICATIONS:  Discontinued Medications    No medications on file              (Please note the MDM and HPI sections of this note were completed with a voice recognition program.  Efforts were made to edit the dictations but occasionally words are mis-transcribed.)    Electronically signed, Magdalena Ventura PA-C,          Magdalena Ventura PA-C  11/18/22 2060

## 2022-11-16 NOTE — ED TRIAGE NOTES
Pt arrives ambulatory for eval of abdominal pain post dnc last week. Pt sts nausea present and bleeding has increased the past 2 days. Pt is a/xo4, rsp nonlabored and pwd.

## 2022-11-17 NOTE — ED NOTES
Discharge and education instructions reviewed. Patient verbalized understanding, teach-back successful. Patient denied questions at this time. No acute distress noted. Patient instructed to follow-up as noted - return to emergency department if symptoms worsen. Patient verbalized understanding. Discharged per EDMD with discharge instructions.           Mihai Mccarty RN  11/16/22 [id number]

## 2022-11-17 NOTE — DISCHARGE INSTRUCTIONS
Follow-up with your OB/GYN doctor tomorrow. Call to schedule follow-up appointment. Take prescribed medication as prescribed only for pain. Take OTC Tylenol Motrin first.  Only use the Norco for breakthrough pain only.   Return for any worsening

## 2023-06-11 ENCOUNTER — APPOINTMENT (EMERGENCY)
Dept: RADIOLOGY | Facility: HOSPITAL | Age: 28
DRG: 832 | End: 2023-06-11
Attending: EMERGENCY MEDICINE

## 2023-06-11 ENCOUNTER — HOSPITAL ENCOUNTER (INPATIENT)
Facility: HOSPITAL | Age: 28
LOS: 1 days | Discharge: HOME | DRG: 832 | End: 2023-06-12
Attending: EMERGENCY MEDICINE | Admitting: STUDENT IN AN ORGANIZED HEALTH CARE EDUCATION/TRAINING PROGRAM

## 2023-06-11 DIAGNOSIS — D75.839 THROMBOCYTOSIS: Primary | ICD-10-CM

## 2023-06-11 DIAGNOSIS — G44.209 ACUTE NON INTRACTABLE TENSION-TYPE HEADACHE: ICD-10-CM

## 2023-06-11 PROBLEM — R51.9 HEADACHE: Status: ACTIVE | Noted: 2023-06-11

## 2023-06-11 PROBLEM — Z34.90 PREGNANCY: Status: ACTIVE | Noted: 2023-06-11

## 2023-06-11 LAB
ALBUMIN SERPL-MCNC: 2.9 G/DL (ref 3.4–5)
ALP SERPL-CCNC: 44 IU/L (ref 35–126)
ALT SERPL-CCNC: 14 IU/L (ref 11–54)
ANION GAP SERPL CALC-SCNC: 8 MEQ/L (ref 3–15)
APTT PPP: 33 SEC (ref 23–35)
APTT PPP: 37 SEC (ref 23–35)
AST SERPL-CCNC: 22 IU/L (ref 15–41)
B-HCG UR QL: POSITIVE
BASOPHILS # BLD: 0 K/UL (ref 0.01–0.1)
BASOPHILS # BLD: 0 K/UL (ref 0.01–0.1)
BASOPHILS NFR BLD: 0 %
BASOPHILS NFR BLD: 0 %
BILIRUB SERPL-MCNC: 0.4 MG/DL (ref 0.3–1.2)
BUN SERPL-MCNC: 6 MG/DL (ref 8–20)
BURR CELLS BLD QL SMEAR: ABNORMAL
CALCIUM SERPL-MCNC: 9 MG/DL (ref 8.9–10.3)
CHLORIDE SERPL-SCNC: 103 MEQ/L (ref 98–109)
CO2 SERPL-SCNC: 23 MEQ/L (ref 22–32)
CREAT SERPL-MCNC: 0.6 MG/DL (ref 0.6–1.1)
D DIMER PPP IA.FEU-MCNC: 0.36 UG/ML FEU (ref 0–0.5)
DIFFERENTIAL METHOD BLD: ABNORMAL
DIFFERENTIAL METHOD BLD: ABNORMAL
EOSINOPHIL # BLD: 0 K/UL (ref 0.04–0.36)
EOSINOPHIL # BLD: 0.09 K/UL (ref 0.04–0.36)
EOSINOPHIL NFR BLD: 0 %
EOSINOPHIL NFR BLD: 1 %
ERYTHROCYTE [DISTWIDTH] IN BLOOD BY AUTOMATED COUNT: 15.2 % (ref 11.7–14.4)
ERYTHROCYTE [DISTWIDTH] IN BLOOD BY AUTOMATED COUNT: 15.5 % (ref 11.7–14.4)
ERYTHROCYTE [DISTWIDTH] IN BLOOD BY AUTOMATED COUNT: 15.5 % (ref 11.7–14.4)
FERRITIN SERPL-MCNC: 38 NG/ML (ref 11–250)
FIBRINOGEN PPP-MCNC: 582 MG/DL (ref 220–480)
GFR SERPL CREATININE-BSD FRML MDRD: >60 ML/MIN/1.73M*2
GLUCOSE SERPL-MCNC: 94 MG/DL (ref 70–99)
HCT VFR BLDCO AUTO: 35.3 % (ref 35–45)
HCT VFR BLDCO AUTO: 36.4 % (ref 35–45)
HCT VFR BLDCO AUTO: 36.4 % (ref 35–45)
HGB BLD-MCNC: 10.9 G/DL (ref 11.8–15.7)
HGB BLD-MCNC: 11.1 G/DL (ref 11.8–15.7)
HGB BLD-MCNC: 11.2 G/DL (ref 11.8–15.7)
INR PPP: 1
INR PPP: 1
IRON SATN MFR SERPL: 9 % (ref 15–45)
IRON SERPL-MCNC: 30 UG/DL (ref 35–150)
LYMPHOCYTES # BLD: 2.13 K/UL (ref 1.2–3.5)
LYMPHOCYTES # BLD: 2.52 K/UL (ref 1.2–3.5)
LYMPHOCYTES NFR BLD: 27 %
LYMPHOCYTES NFR BLD: 28 %
MCH RBC QN AUTO: 24.8 PG (ref 28–33.2)
MCH RBC QN AUTO: 25.3 PG (ref 28–33.2)
MCH RBC QN AUTO: 25.5 PG (ref 28–33.2)
MCHC RBC AUTO-ENTMCNC: 30.5 G/DL (ref 32.2–35.5)
MCHC RBC AUTO-ENTMCNC: 30.8 G/DL (ref 32.2–35.5)
MCHC RBC AUTO-ENTMCNC: 30.9 G/DL (ref 32.2–35.5)
MCV RBC AUTO: 80.7 FL (ref 83–98)
MCV RBC AUTO: 82.7 FL (ref 83–98)
MCV RBC AUTO: 82.9 FL (ref 83–98)
MONOCYTES # BLD: 0.18 K/UL (ref 0.28–0.8)
MONOCYTES # BLD: 0.4 K/UL (ref 0.28–0.8)
MONOCYTES NFR BLD: 2 %
MONOCYTES NFR BLD: 5 %
NEUTS BAND # BLD: 5.37 K/UL (ref 1.7–7)
NEUTS BAND # BLD: 6.2 K/UL (ref 1.7–7)
NEUTS SEG NFR BLD: 68 %
NEUTS SEG NFR BLD: 69 %
OVALOCYTES BLD QL SMEAR: ABNORMAL
OVALOCYTES BLD QL SMEAR: ABNORMAL
PATH REV BLD -IMP: NORMAL
PDW BLD AUTO: 10.5 FL (ref 9.4–12.3)
PDW BLD AUTO: 10.5 FL (ref 9.4–12.3)
PDW BLD AUTO: 10.6 FL (ref 9.4–12.3)
PLAT MORPH BLD: NORMAL
PLATELET # BLD AUTO: 1030 K/UL (ref 150–369)
PLATELET # BLD AUTO: 1039 K/UL (ref 150–369)
PLATELET # BLD AUTO: 998 K/UL (ref 150–369)
PLATELET # BLD EST: ABNORMAL 10*3/UL
PLATELET # BLD EST: ABNORMAL 10*3/UL
POCT TEST: ABNORMAL
POIKILOCYTOSIS BLD QL SMEAR: ABNORMAL
POTASSIUM SERPL-SCNC: 3.6 MEQ/L (ref 3.6–5.1)
PROT SERPL-MCNC: 7 G/DL (ref 6–8.2)
PROTHROMBIN TIME: 13.2 SEC (ref 12.2–14.5)
PROTHROMBIN TIME: 13.5 SEC (ref 12.2–14.5)
RBC # BLD AUTO: 4.27 M/UL (ref 3.93–5.22)
RBC # BLD AUTO: 4.39 M/UL (ref 3.93–5.22)
RBC # BLD AUTO: 4.51 M/UL (ref 3.93–5.22)
SCHISTOCYTES BLD QL SMEAR: ABNORMAL
SODIUM SERPL-SCNC: 134 MEQ/L (ref 136–144)
TIBC SERPL-MCNC: 328 UG/DL (ref 270–460)
UIBC SERPL-MCNC: 298 UG/DL (ref 180–360)
WBC # BLD AUTO: 7.9 K/UL (ref 3.8–10.5)
WBC # BLD AUTO: 8.71 K/UL (ref 3.8–10.5)
WBC # BLD AUTO: 8.99 K/UL (ref 3.8–10.5)

## 2023-06-11 PROCEDURE — G1004 CDSM NDSC: HCPCS

## 2023-06-11 PROCEDURE — 96374 THER/PROPH/DIAG INJ IV PUSH: CPT

## 2023-06-11 PROCEDURE — 81270 JAK2 GENE: CPT | Performed by: EMERGENCY MEDICINE

## 2023-06-11 PROCEDURE — 96376 TX/PRO/DX INJ SAME DRUG ADON: CPT

## 2023-06-11 PROCEDURE — 70544 MR ANGIOGRAPHY HEAD W/O DYE: CPT | Mod: MF

## 2023-06-11 PROCEDURE — 99285 EMERGENCY DEPT VISIT HI MDM: CPT | Mod: 25

## 2023-06-11 PROCEDURE — 25800000 HC PHARMACY IV SOLUTIONS: Performed by: EMERGENCY MEDICINE

## 2023-06-11 PROCEDURE — 76817 TRANSVAGINAL US OBSTETRIC: CPT

## 2023-06-11 PROCEDURE — 96375 TX/PRO/DX INJ NEW DRUG ADDON: CPT

## 2023-06-11 PROCEDURE — 85610 PROTHROMBIN TIME: CPT | Performed by: EMERGENCY MEDICINE

## 2023-06-11 PROCEDURE — 85247 CLOT FACTOR VIII MULTIMETRIC: CPT | Performed by: EMERGENCY MEDICINE

## 2023-06-11 PROCEDURE — 83540 ASSAY OF IRON: CPT | Performed by: EMERGENCY MEDICINE

## 2023-06-11 PROCEDURE — 63600000 HC DRUGS/DETAIL CODE: Performed by: EMERGENCY MEDICINE

## 2023-06-11 PROCEDURE — 80053 COMPREHEN METABOLIC PANEL: CPT | Performed by: EMERGENCY MEDICINE

## 2023-06-11 PROCEDURE — 76705 ECHO EXAM OF ABDOMEN: CPT

## 2023-06-11 PROCEDURE — 36415 COLL VENOUS BLD VENIPUNCTURE: CPT | Performed by: EMERGENCY MEDICINE

## 2023-06-11 PROCEDURE — 99223 1ST HOSP IP/OBS HIGH 75: CPT | Performed by: STUDENT IN AN ORGANIZED HEALTH CARE EDUCATION/TRAINING PROGRAM

## 2023-06-11 PROCEDURE — 96361 HYDRATE IV INFUSION ADD-ON: CPT

## 2023-06-11 PROCEDURE — 85027 COMPLETE CBC AUTOMATED: CPT | Performed by: EMERGENCY MEDICINE

## 2023-06-11 PROCEDURE — 99223 1ST HOSP IP/OBS HIGH 75: CPT | Performed by: PSYCHIATRY & NEUROLOGY

## 2023-06-11 PROCEDURE — 63700000 HC SELF-ADMINISTRABLE DRUG

## 2023-06-11 PROCEDURE — 85025 COMPLETE CBC W/AUTO DIFF WBC: CPT | Performed by: EMERGENCY MEDICINE

## 2023-06-11 PROCEDURE — 85379 FIBRIN DEGRADATION QUANT: CPT | Performed by: EMERGENCY MEDICINE

## 2023-06-11 PROCEDURE — 63600000 HC DRUGS/DETAIL CODE

## 2023-06-11 PROCEDURE — 82728 ASSAY OF FERRITIN: CPT | Performed by: EMERGENCY MEDICINE

## 2023-06-11 PROCEDURE — 85730 THROMBOPLASTIN TIME PARTIAL: CPT | Performed by: EMERGENCY MEDICINE

## 2023-06-11 PROCEDURE — 70551 MRI BRAIN STEM W/O DYE: CPT | Mod: ME

## 2023-06-11 PROCEDURE — 76815 OB US LIMITED FETUS(S): CPT

## 2023-06-11 PROCEDURE — 70450 CT HEAD/BRAIN W/O DYE: CPT | Mod: ME

## 2023-06-11 PROCEDURE — 12000000 HC ROOM AND CARE MED/SURG

## 2023-06-11 RX ORDER — DOCUSATE SODIUM 100 MG/1
100 CAPSULE, LIQUID FILLED ORAL
COMMUNITY
Start: 2022-11-10 | End: 2023-06-11

## 2023-06-11 RX ORDER — METOCLOPRAMIDE HYDROCHLORIDE 5 MG/ML
10 INJECTION INTRAMUSCULAR; INTRAVENOUS ONCE
Status: COMPLETED | OUTPATIENT
Start: 2023-06-11 | End: 2023-06-11

## 2023-06-11 RX ORDER — DEXAMETHASONE SODIUM PHOSPHATE 4 MG/ML
10 INJECTION, SOLUTION INTRA-ARTICULAR; INTRALESIONAL; INTRAMUSCULAR; INTRAVENOUS; SOFT TISSUE ONCE
Status: COMPLETED | OUTPATIENT
Start: 2023-06-11 | End: 2023-06-11

## 2023-06-11 RX ORDER — DEXTROSE 40 %
15-30 GEL (GRAM) ORAL AS NEEDED
Status: DISCONTINUED | OUTPATIENT
Start: 2023-06-11 | End: 2023-06-12 | Stop reason: HOSPADM

## 2023-06-11 RX ORDER — ACETAMINOPHEN 325 MG/1
650 TABLET ORAL EVERY 6 HOURS PRN
Status: DISCONTINUED | OUTPATIENT
Start: 2023-06-11 | End: 2023-06-12 | Stop reason: HOSPADM

## 2023-06-11 RX ORDER — METOCLOPRAMIDE HYDROCHLORIDE 5 MG/ML
5 INJECTION INTRAMUSCULAR; INTRAVENOUS EVERY 6 HOURS PRN
Status: DISCONTINUED | OUTPATIENT
Start: 2023-06-11 | End: 2023-06-12 | Stop reason: HOSPADM

## 2023-06-11 RX ORDER — HEPARIN SODIUM 5000 [USP'U]/ML
5000 INJECTION, SOLUTION INTRAVENOUS; SUBCUTANEOUS EVERY 8 HOURS
Status: DISCONTINUED | OUTPATIENT
Start: 2023-06-11 | End: 2023-06-12 | Stop reason: HOSPADM

## 2023-06-11 RX ORDER — DIPHENHYDRAMINE HCL 50 MG/ML
50 VIAL (ML) INJECTION ONCE
Status: COMPLETED | OUTPATIENT
Start: 2023-06-11 | End: 2023-06-11

## 2023-06-11 RX ORDER — IBUPROFEN 200 MG
16-32 TABLET ORAL AS NEEDED
Status: DISCONTINUED | OUTPATIENT
Start: 2023-06-11 | End: 2023-06-12 | Stop reason: HOSPADM

## 2023-06-11 RX ORDER — DEXTROSE 50 % IN WATER (D50W) INTRAVENOUS SYRINGE
25 AS NEEDED
Status: DISCONTINUED | OUTPATIENT
Start: 2023-06-11 | End: 2023-06-12 | Stop reason: HOSPADM

## 2023-06-11 RX ORDER — DIPHENHYDRAMINE HCL 50 MG/ML
25 VIAL (ML) INJECTION ONCE
Status: COMPLETED | OUTPATIENT
Start: 2023-06-11 | End: 2023-06-11

## 2023-06-11 RX ADMIN — DIPHENHYDRAMINE HYDROCHLORIDE 50 MG: 50 INJECTION INTRAMUSCULAR; INTRAVENOUS at 11:27

## 2023-06-11 RX ADMIN — DEXAMETHASONE SODIUM PHOSPHATE 10 MG: 4 INJECTION, SOLUTION INTRA-ARTICULAR; INTRALESIONAL; INTRAMUSCULAR; INTRAVENOUS; SOFT TISSUE at 06:58

## 2023-06-11 RX ADMIN — SODIUM CHLORIDE 125 MG: 9 INJECTION, SOLUTION INTRAVENOUS at 14:32

## 2023-06-11 RX ADMIN — HEPARIN SODIUM 5000 UNITS: 5000 INJECTION, SOLUTION INTRAVENOUS; SUBCUTANEOUS at 23:40

## 2023-06-11 RX ADMIN — ACETAMINOPHEN 650 MG: 325 TABLET, FILM COATED ORAL at 23:39

## 2023-06-11 RX ADMIN — HEPARIN SODIUM 5000 UNITS: 5000 INJECTION, SOLUTION INTRAVENOUS; SUBCUTANEOUS at 17:26

## 2023-06-11 RX ADMIN — ACETAMINOPHEN 650 MG: 325 TABLET, FILM COATED ORAL at 17:31

## 2023-06-11 RX ADMIN — METOCLOPRAMIDE 10 MG: 5 INJECTION, SOLUTION INTRAMUSCULAR; INTRAVENOUS at 06:59

## 2023-06-11 RX ADMIN — SODIUM CHLORIDE 1000 ML: 9 INJECTION, SOLUTION INTRAVENOUS at 06:55

## 2023-06-11 RX ADMIN — DIPHENHYDRAMINE HYDROCHLORIDE 25 MG: 50 INJECTION INTRAMUSCULAR; INTRAVENOUS at 06:57

## 2023-06-11 ASSESSMENT — ENCOUNTER SYMPTOMS: HEADACHES: 1

## 2023-06-11 NOTE — ASSESSMENT & PLAN NOTE
Platelets >1000, previously normal in 300s in 11/2022  Smear with thrombocytosis but no dyspoesis or blasts  US negative for splenomegaly  Iron deficient on labs  DIC panel with elevated fibrinogen  Unknown etiology at this time with broad differential. Diff dx of thrombocytosis include: Essential thrombocytopenia,  Anemia/blood loss - Iron deficiency, hemolysis, Infection - Viral, bacterial, mycobacterial, and fungal causes, Non-infectious inflammation - Malignancy, rheumatologic conditions    - Heme consulted  - F/u VW panel, genetic mutation labs  - IV iron   - Heparin for DVT prophylaxis

## 2023-06-11 NOTE — ASSESSMENT & PLAN NOTE
9 weeks pregnant with no complications thus far    -multivitamin and f/u for prenatal care outpatient

## 2023-06-11 NOTE — ASSESSMENT & PLAN NOTE
5 days of gradually worsening L posterior headache without associated symptoms or neurological deficits. DDx includes IIH, venous sinus thrombosis in setting of thrombocytosis, migraine    IIH - patient does not clinically present with any progressive visual disturbances, 6th cranial nerve palsy, tinnitus, exacerbating positional symptoms but MRI w/o contrast with findings concerning for possible pseudotumor cerebri    Cerebral venous sinus thrombosis - patient presenting with constant L sided headache for 5 days and has risk factor for hypercoagulability state being pregnant. Also need to consider causes such as deficiency of antithrombin III, protein C, and protein S; factor V Leiden positivity.     Migraine - patient had associated symptoms of photophobia and symptoms mildly improved with lying in a dark room and Tylenol at home. In the hospital, pt's symptoms have markedly improved with abortive treatment with IV Decadron + Benadryl + Reglan.     - Neurology consulted, f/u neuro recs  - Ophthalmology consulted > will need evaluation for papilledema and Optical Coherence Tomography, if concerning for IIH then will need LP, f/u ophtho recs  - Symptomatic treatment with Tylenol

## 2023-06-11 NOTE — CONSULTS
"  Neurology Consult Note    Reason for Consultation: Headache, Possible Pseudotumor Cerebri  Chief Complaint: \"Headache\"    History of Presenting Illness:  The patient is a 27 y.o.  year old Right handed Female, who is currently ~ 9 weeks pregnant, presents to the Emergency Room for evaluation of new Left sided headache for the past 4 days.     History is obtained from the patient.     The patient reports having new onset, nearly constant headache for the past 4 days, without any preceding trauma or obvious provoking factor prior to the onset of her initial symptoms. She describes her headache as a sensation of throbbing and sharp pain which originates in the Left parieto-occipital region, radiating to Left frontoparietal scalp, lasting all day on average. These symptoms are associated with photophobia, but not much else. The patient notes having persistent headache all throughout the day, over the past 4 days, without any remission, but with superimposed fluctuations. There are no preceding symptoms prior to the onset of the headache suggesting an aura. There are no triggers which can provoke these symptoms in the absence of any preceding symptoms. The patient denies any significant environmental or postural exacerbating or alleviating factors, but notes that she feels more comfortable if she is able to lay down while experiencing severe symptoms. The patient reports using Tylenol for pain control, which did result in significant improvement in her symptoms for a few hours, before worsening in her pain again. She reports that she generally avoided using any analgesic therapies due to concern with her active pregnancy.    Red Flag symptoms: There is no reported personal history of significant TBI or malignancy. There is no reported family hx of neurologic malignancies. There is no reported personal history of recurrent fevers or B-symptoms including no reported unintentional weight loss associated with the current " neurologic symptoms. There is no known worsening of symptoms with Valsalva maneuvers or with laying down supine compared to standing upright.    There is no reported changes in vision, changes in hearing, changes in speech, changes in peripheral sensation, changes in strength, changes in gait or balance, changes in bowel/bladder control, nausea/vomiting, photophobia, phonophobia, dizziness, lightheadedness or vertigo associated with the symptoms reported above.    The patient does report recent history of Travel to [place] about 2 to 3 weeks ago. She additionally reports recent Strep throat about 2 weeks ago, which has resolved at this time.      Review of Systems  All other systems reviewed and negative except as noted in the HPI.    Allergies: Patient has no known allergies.    Current Inpatient Medications   Medication Dose Route Frequency Provider Last Rate Last Admin    acetaminophen (TYLENOL) tablet 650 mg  650 mg oral q6h PRN Ash Trammell DO        glucose chewable tablet 16-32 g of dextrose  16-32 g of dextrose oral PRN Ash Trammell DO        Or    dextrose 40 % oral gel 15-30 g of dextrose  15-30 g of dextrose oral PRN Ash Trammell DO        Or    glucagon (GLUCAGEN) injection 1 mg  1 mg intramuscular PRN Ash Trammell DO        Or    dextrose 50 % in water (D50) injection 12.5 g  25 mL intravenous PRN Ash Trammell DO        heparin (porcine) 5,000 unit/mL injection 5,000 Units  5,000 Units subcutaneous q8h [facility] Ash Trammell DO        [START ON 6/12/2023] iron sucrose (VENOFER) 200 mg in sodium chloride 0.9 % 50 mL IVPB  200 mg intravenous Daily Ash Trammell DO        metoclopramide (REGLAN) injection 5 mg  5 mg intravenous q6h PRN Ash Trammell DO           Medical History: History reviewed. No pertinent past medical history.    Surgical History: History reviewed. No pertinent surgical history.    Social History:   Social History     Socioeconomic History    Marital status: Single      Spouse name: None    Number of children: None    Years of education: None    Highest education level: None   Tobacco Use    Smoking status: Never    Smokeless tobacco: Never   Substance and Sexual Activity    Alcohol use: Never    Drug use: Never    Sexual activity: Defer     Social Determinants of Health     Food Insecurity: No Food Insecurity (6/11/2023)    Hunger Vital Sign     Worried About Running Out of Food in the Last Year: Never true     Ran Out of Food in the Last Year: Never true       Family History: History reviewed. No pertinent family history.    Objective   Temp:  [36.6 °C (97.9 °F)] 36.6 °C (97.9 °F)  Heart Rate:  [75-95] 90  Resp:  [17-20] 18  BP: (112-132)/(61-83) 120/65  SpO2:  [98 %-100 %] 100 %  Oxygen Therapy: None (Room air)    Physical Exam:  General Appearance: Young female with age appropriate appearance without any acute clinical distress  Cardiac: RRR, no peripheral edema  Skin: Warm    Neurologic Exam:  Head/Neck:   - No scalp tenderness on palpation  - No nuchal rigidity    Mental Status:  - Level of Consciousness: Well Awake  - Alertness: Alert in response to voice with appropriately timed responses  - Attention: Intact/Normal  - Orientation: Intact to person/self; Intact to time (date/day/month/year); Intact to location; Intact to situation appropriately  - Neglect - No unilateral visual or spatial neglect noted on exam  - Mood/Affect - Normal mood with euthymic appearing affect  - Behavior/Cooperation - Pleasant and cooperative with exam    - Language: Speech is fluent with normal prosody  1. Naming - Names all simple objects to confrontation. Able to name parts of objects.  2. Comprehension - Able to follow simple commands well; Able to follow complex 3 step commands crossing Right-Left without any difficulties  3. Repetition - Able to repeat full sentences without difficulty  - Calculations: Able to perform simple calculations - Able to calculate quarters in $1.75  correctly  - No Ideomotor Apraxia    Cranial Nerves:  CN II: Visual Fields: Intact to confrontation in all quadrants bilaterally without any neglect on double simultaneous stimulation; Pupils are equal, round and reactive to light bilaterally; Fundus exam: Optic discs are inadequately visualized  CN III/IV/VI: Extraocular movements are intact bilaterally in all directions without any nystagmus; Normal smooth pursuit; No ptosis bilaterally  CN V: Normal facial sensation in V1, V2 and V3 distributions bilaterally; Good bite strength bilaterally  CN VII: Normal symmetric facial movement at rest, with smiling and with speech in both upper and lower facial distributions  CN VIII: Hearing intact to finger rubbing on both sides  CN IX/X: Symmetric palate elevation, No uvular deviation, No dysarthria noted  CN XI: Normal/strong head turning bilaterally; Normal 5/5 shoulder shrug bilaterally  CN XII: Normal/midline tongue protrusion    Motor:  Bulk: No focal atrophy noted on visual inspection of the extremities  Tone: Normal tone without any spasticity or hypotonia noted on exam    Strength:  Action/Muscle  Shoulder abduction/Deltoids - 5/5 bilaterally  Elbow Flexion/Biceps - 5/5 bilaterally  Elbow Extension/Triceps - 5/5 bilaterally  Wrist Flexion - 5/5 bilaterally  Wrist Extension - 5/5 bilaterally   strength/Interosseous - 5/5 bilaterally  Hip Flexion/Iliopsoas - 5/5 bilaterally  Hip Abduction - 5/5 bilaterally  Hip Adduction - 5/5 bilaterally  Knee Flexion/Hamstrings - 5/5 bilaterally  Knee Extension/Quadriceps - 5/5 bilaterally  Ankle Dorsiflexion/Tibialis Ant - 5/5 bilaterally  Ankle Planter flexion/Gastrocn - 5/5 bilaterally    No tremors or abnormal movements noted      Sensory:  Sensation intact to light touch in both upper and lower extremities bilaterally  Negative Romberg    Reflexes:  Reflex Name:                Right    Left  Biceps                              2+       2+  Brachioradialis                  2+       2+  Knee Jerk                        2+       2+  Ankle Jerk                       2+       2+  Babinski                     Absent   Absent  Clonus                         None     None    Coordination:  No truncal ataxia  No dysmetria on Finger to nose or Heel to shin testing bilaterally        Labs: I have reviewed the following lab results:  Admission on 06/11/2023   Component Date Value    WBC 06/11/2023 8.99     RBC 06/11/2023 4.51     Hemoglobin 06/11/2023 11.2 (L)     Hematocrit 06/11/2023 36.4     MCV 06/11/2023 80.7 (L)     MCH 06/11/2023 24.8 (L)     MCHC 06/11/2023 30.8 (L)     RDW 06/11/2023 15.5 (H)     Platelets 06/11/2023 1,030 (HH)     MPV 06/11/2023 10.6     Differential Type 06/11/2023 Manu     Neutrophils 06/11/2023 69     Lymphocytes 06/11/2023 28     Monocytes 06/11/2023 2     Eosinophils 06/11/2023 1     Basophils 06/11/2023 0     Neutrophils, Absolute 06/11/2023 6.20     Lymphocytes, Absolute 06/11/2023 2.52     Monocytes, Absolute 06/11/2023 0.18 (L)     Eosinophils, Absolute 06/11/2023 0.09     Basophils, Absolute 06/11/2023 0.00 (L)     PLT Morphology 06/11/2023 Normal     Platelet Estimate 06/11/2023 Increased (>400,000)     Ovalocytes 06/11/2023 Occasional     Poikilocytes 06/11/2023 1+     Schistocytes 06/11/2023 Occasional     [place] Cells 06/11/2023 1+     Sodium 06/11/2023 134 (L)     Potassium 06/11/2023 3.6     Chloride 06/11/2023 103     CO2 06/11/2023 23     BUN 06/11/2023 6 (L)     Creatinine 06/11/2023 0.6     Glucose 06/11/2023 94     Calcium 06/11/2023 9.0     AST (SGOT) 06/11/2023 22     ALT (SGPT) 06/11/2023 14     Alkaline Phosphatase 06/11/2023 44     Total Protein 06/11/2023 7.0     Albumin 06/11/2023 2.9 (L)     Bilirubin, Total 06/11/2023 0.4     eGFR 06/11/2023 >60.0     Anion Gap 06/11/2023 8     POCT BhCG, Urine Qual 06/11/2023 Positive (A)     POC Test 06/11/2023 POC     WBC 06/11/2023 7.90     RBC 06/11/2023  4.39     Hemoglobin 06/11/2023 11.1 (L)     Hematocrit 06/11/2023 36.4     MCV 06/11/2023 82.9 (L)     MCH 06/11/2023 25.3 (L)     MCHC 06/11/2023 30.5 (L)     RDW 06/11/2023 15.2 (H)     Platelets 06/11/2023 998 (H)     MPV 06/11/2023 10.5     Differential Type 06/11/2023 Manu     Neutrophils 06/11/2023 68     Lymphocytes 06/11/2023 27     Monocytes 06/11/2023 5     Eosinophils 06/11/2023 0     Basophils 06/11/2023 0     Neutrophils, Absolute 06/11/2023 5.37     Lymphocytes, Absolute 06/11/2023 2.13     Monocytes, Absolute 06/11/2023 0.40     Eosinophils, Absolute 06/11/2023 0.00 (L)     Basophils, Absolute 06/11/2023 0.00 (L)     Platelet Estimate 06/11/2023 Increased (>400,000)     Ovalocytes 06/11/2023 Occasional     Pathology Review 06/11/2023                      Value:Marked thrombocytosis.  Rule out myeloproliferative neoplasm.  Microcytic anemia.  Rule out iron deficiency anemia and blood loss.  Normal white blood cell count with a preponderance of neutrophils.  No definite evidence of dyspoiesis or blasts.    Electronically signed by Calin Sanders MD on June 11, 2023 at 11:50 AM.    PT 06/11/2023 13.2     INR 06/11/2023 1.0     PTT 06/11/2023 37 (H)     Iron 06/11/2023 30 (L)     TIBC 06/11/2023 328     UIBC 06/11/2023 298     Iron Saturation 06/11/2023 9 (L)     Ferritin 06/11/2023 38     PT 06/11/2023 13.5     INR 06/11/2023 1.0     PTT 06/11/2023 33     Fibrinogen 06/11/2023 582 (H)     D-Dimer, Quant 06/11/2023 0.36     WBC 06/11/2023 8.71     RBC 06/11/2023 4.27     Hemoglobin 06/11/2023 10.9 (L)     Hematocrit 06/11/2023 35.3     MCV 06/11/2023 82.7 (L)     MCH 06/11/2023 25.5 (L)     MCHC 06/11/2023 30.9 (L)     RDW 06/11/2023 15.5 (H)     Platelets 06/11/2023 1,039 (HH)     MPV 06/11/2023 10.5        Imaging: I have personally reviewed the images for the NeuroImaging studies listed below. My interpretation is noted as following:     MRI Brain  without contrast: 6/11/2023  1.  No acute cerebral ischemia or infarction.  No chronic ischemic changes. No acute or chronic hemorrhage.  No cerebral edema.  No hydrocephalus.  No obvious findings to suggest intracranial mass lesion.  2.  Partially empty sella. Bilateral optic nerves are tortuous, with some dilation of optic nerve sheath in the retrobulbar portion on both sides.  However, no significant flattening of the posterior globe on either side. No classic slit-like ventricles.  No gyral crowding at the vertex.  3.  Bilateral maxillary sinusitis with fluid levels.  Additional mucosal thickening and inflammation noted in Left frontal, bilateral Ethmoid and bilateral Sphenoid sinuses.  Additional fluid level noted in the Left mastoid air cells.    MRV Head without contrast: 6/11/2023  -- Potential loss of venous flow signal is noted in bilateral transverse sinuses, distally.  This may represent slow flow related artifact or underlying stenosis.  However, thrombosis cannot be ruled out.        Impression/Assessment:  1.  Clinically, the patient's current presentation is suggestive of Migraine headache, which is markedly improved in symptomatic burden with abortive treatment with IV Decadron + Benadryl + Reglan. Based on the current clinical examination, there is no evidence to suggest elevated Intracranial Pressure, related to any etiology.    2.  The patient's current MRI Brain studies do not reveal any significant cerebral structural abnormalities, or definitive evidence to conclude elevated ICP. There are some anatomic variants noted which can be seen in association with Pseudotumor Cerebri - BUT clinically the patient does not have symptoms to suggest that diagnosis. More importantly, dural venous sinus thrombosis is not definitively ruled out with non-contrast imaging.    3.  The patient's routine work up revealed critical degree of Thrombocytosis, with Platelet count > 1 million per uL. It is unclear if  this is actually associated with the patient's presentation, or simply a critical incidental finding.      Recommendations:  1.  Please obtain OCT testing and dilated eye examination with Ophthalmology in order to screen for elevated ICP. If the patient has significant elevation in RNFL thickness, then further inpatient neurologic work up is recommended. Otherwise, in the presence of normal/reassuring ophthalmologic findings, the patient will not require any further inpatient neurologic work up.     2.  If the patient necessitates further neurologic work up based on OCT/Ophthalmologic findings, then please obtain MRI Brain with contrast (with 3D MPRAGE sequence), as well as MRV Brain with contrast, in order to definitively evaluate for Intracranial neoplastic disease and cerebral venous sinus thrombosis.     3.  If there is concern for elevated ICP on ophthalmologic testing, but the repeat MRI studies with contrast as still unremarkable, then invasive CSF testing can be considered at that time, in order to evaluate for any acute intrathecal infectious, inflammatory or neoplastic findings.     4.  Will recommend monitoring the patient's acute headache burden, and using tylenol for abortive management for now.     5.  Please take into account the patient's current Pregnant state prior to proceed with any diagnostic testing and/or management, and avoid anything that can result in potential risk to the fetus.     The patient is strongly recommended to follow up with Dr. Webber in [facility] after discharge.    The patient has been counseled extensively on the current neurologic findings, Impression and recommendations noted above. All questions are answered in detail.     The patient's case/recommendations have been discussed with Dr. Ulloa/ER team and Dr. Khanna/Primary Medicine team in detail today.     The Inpatient Neurology team will review further results, and provide updated  recommendations if necessary. Otherwise, the Neurology team will remain available for further follow up on an as needed basis. Please contact Neurology immediately in case any new/worsening neurologic symptoms or concerns arise.

## 2023-06-11 NOTE — Clinical Note
Send to the following Provider Care Team:: [facility] RESIDENT ADMITTING TEAM [[id number]]

## 2023-06-11 NOTE — ED PROVIDER NOTES
Emergency Medicine Note  HPI   HISTORY OF PRESENT ILLNESS     27-year-old female, G2, P0, presents today at 9 weeks of pregnancy with a headache.  She states that the headache started 5 days ago on Wednesday, she thought not much of it as she has had headaches in the past.  The headache has persisted and she feels as if its been getting worse, she took Tylenol and Tylenol will help briefly but then will have continued pain.  She has had no nausea no vomiting.  No fevers or chills.  No neck pain.  Minimal photophobia.  She has been feeling well from a pregnancy standpoint, she had her first OB appointment on Wednesday and everything looked normal.  She did have some bleeding 3 weeks ago for which she had an urgent ultrasound that demonstrated an IUP.    Patient has not had any other bleeding episodes.  She did have some easy bruising recently, but no bruising or bleeding when she brushes her teeth      History provided by:  Patient and medical records   used: No          Patient History   PAST HISTORY     Reviewed from Nursing Triage:       History reviewed. No pertinent past medical history.    History reviewed. No pertinent surgical history.    History reviewed. No pertinent family history.    Social History     Tobacco Use    Smoking status: Never    Smokeless tobacco: Never   Substance Use Topics    Alcohol use: Never    Drug use: Never         Review of Systems   REVIEW OF SYSTEMS     Review of Systems      VITALS     ED Vitals    Date/Time Temp Pulse Resp BP SpO2 Who   06/11/23 1238 -- 80 18 132/61 100 % JLS   06/11/23 1032 -- 81 17 112/66 100 % SPB   06/11/23 0802 -- 75 18 129/71 98 % SPB   06/11/23 0543 36.6 °C (97.9 °F) 77 19 132/83 98 % CT        Pulse Ox %: 98 % (06/11/23 0848)  Pulse Ox Interpretation: Normal (06/11/23 0848)  Heart Rate: 75 (06/11/23 0848)  Rhythm Strip Interpretation: Normal Sinus Rhythm (06/11/23 0848)     Physical Exam   PHYSICAL EXAM     Physical Exam  Vitals  and nursing note reviewed.   Constitutional:       General: She is not in acute distress.     Appearance: She is well-developed.   HENT:      Head: Normocephalic and atraumatic.   Eyes:      Conjunctiva/sclera: Conjunctivae normal.   Cardiovascular:      Rate and Rhythm: Normal rate and regular rhythm.      Heart sounds: No murmur heard.  Pulmonary:      Effort: Pulmonary effort is normal. No respiratory distress.      Breath sounds: Normal breath sounds.   Abdominal:      General: Bowel sounds are normal.      Palpations: Abdomen is soft.      Tenderness: There is no abdominal tenderness.   Musculoskeletal:         General: Normal range of motion.      Cervical back: Neck supple.   Skin:     General: Skin is warm and dry.   Neurological:      Mental Status: She is alert.      Comments: SHAYNA, EOMI, cn 2-12 intact, no tongue deviation, muscle strength 5/5, normal finger to nose and heel to shin, no dysarthria/aphasia   Psychiatric:         Behavior: Behavior normal.           PROCEDURES     Procedures     DATA     Results     Procedure Component Value Units Date/Time    DIC [[id number]]  (Abnormal) Collected: 06/11/23 1005    Specimen: Blood, Venous Updated: 06/11/23 1121    Narrative:      The following orders were created for panel order DIC.  Procedure                               Abnormality         Status                     ---------                               -----------         ------                     DIC (BLUE TUBE)[[id number]]              Abnormal            Final result               CBC[[id number]]                          Abnormal            Final result                 Please view results for these tests on the individual orders.    DIC (BLUE TUBE) [[id number]]  (Abnormal) Collected: 06/11/23 1005    Specimen: Blood, Venous Updated: 06/11/23 1121     PT 13.5 sec      INR 1.0     PTT 33 sec      Fibrinogen 582 mg/dL      D-Dimer, Quant 0.36 ug/mL FEU      Comment: The D-Dimer assay can be used as an  aid in the diagnosis of DVT or PE. The test can not be used by itself to exclude DVT or PE. When used as a diagnostic aid, the cutoff value is the same as the reference range: <0.5 ug/ml FEU.       CBC [[id number]]  (Abnormal) Collected: 06/11/23 1005    Specimen: Blood, Venous Updated: 06/11/23 1108     WBC 8.71 K/uL      RBC 4.27 M/uL      Hemoglobin 10.9 g/dL      Hematocrit 35.3 %      MCV 82.7 fL      MCH 25.5 pg      MCHC 30.9 g/dL      RDW 15.5 %      Platelets 1,039 K/uL      Comment: CONSISTENT WITH PREVIOUS RESULTS. This result has been called to DR ITALIA ZABALA by Raoul Sorto on 06 11 23 at 11:07, and has been read back.         MPV 10.5 fL     JAK2 V617F Cascading Refl to CALR, JAK2 Exon 12, MPL, and CSF3R Blood, Venous [[id number]] Collected: 06/11/23 1005    Specimen: Blood, Venous Updated: 06/11/23 1043    Von Willebrand panel [[id number]] Collected: 06/11/23 1005    Specimen: Blood, Venous Updated: 06/11/23 1042    Ferritin [[id number]]  (Normal) Collected: 06/11/23 0654    Specimen: Blood, Venous Updated: 06/11/23 0939     Ferritin 38 ng/mL     Protime-INR [[id number]]  (Normal) Collected: 06/11/23 0654    Specimen: Blood, Venous Updated: 06/11/23 0929     PT 13.2 sec      INR 1.0     Comment: INR has no defined significance when PT is within Reference Range.       APTT [[id number]]  (Abnormal) Collected: 06/11/23 0654    Specimen: Blood, Venous Updated: 06/11/23 0929     PTT 37 sec      Comment: The Standard Therapeutic Range for Heparin is 74 to 106 seconds.       Iron and TIBC [[id number]]  (Abnormal) Collected: 06/11/23 0654    Specimen: Blood, Venous Updated: 06/11/23 0927     Iron 30 ug/dL      TIBC 328 ug/dL      UIBC 298 ug/dL      Iron Saturation 9 %     CBC and differential [[id number]]  (Abnormal) Collected: 06/11/23 0811    Specimen: Blood, Venous Updated: 06/11/23 0901     WBC 7.90 K/uL      RBC 4.39 M/uL      Hemoglobin 11.1 g/dL      Hematocrit 36.4 %      MCV 82.9 fL      MCH 25.3 pg       MCHC 30.5 g/dL      RDW 15.2 %      Platelets 998 K/uL      Comment: CONSISTENT WITH PREVIOUS RESULTS        MPV 10.5 fL      Differential Type Manu     Neutrophils 68 %      Lymphocytes 27 %      Monocytes 5 %      Eosinophils 0 %      Basophils 0 %      Neutrophils, Absolute 5.37 K/uL      Lymphocytes, Absolute 2.13 K/uL      Monocytes, Absolute 0.40 K/uL      Eosinophils, Absolute 0.00 K/uL      Basophils, Absolute 0.00 K/uL      Platelet Estimate Increased (>400,000)     Ovalocytes Occasional    Comprehensive metabolic panel [[id number]]  (Abnormal) Collected: 06/11/23 0654    Specimen: Blood, Venous Updated: 06/11/23 0810     Sodium 134 mEQ/L      Potassium 3.6 mEQ/L      Comment: Results obtained on plasma. Plasma Potassium values may be up to 0.4 mEQ/L less than serum values. The differences may be greater for patients with high platelet or white cell counts.        Chloride 103 mEQ/L      CO2 23 mEQ/L      BUN 6 mg/dL      Creatinine 0.6 mg/dL      Glucose 94 mg/dL      Calcium 9.0 mg/dL      AST (SGOT) 22 IU/L      ALT (SGPT) 14 IU/L      Alkaline Phosphatase 44 IU/L      Total Protein 7.0 g/dL      Comment: Test performed on plasma which typically contains approximately 0.4 g/dL more protein than serum.        Albumin 2.9 g/dL      Bilirubin, Total 0.4 mg/dL      eGFR >60.0 mL/min/1.73m*2      Anion Gap 8 mEQ/L     CBC and differential [[id number]]  (Abnormal) Collected: 06/11/23 0654    Specimen: Blood, Venous Updated: 06/11/23 0809     WBC 8.99 K/uL      RBC 4.51 M/uL      Hemoglobin 11.2 g/dL      Hematocrit 36.4 %      MCV 80.7 fL      MCH 24.8 pg      MCHC 30.8 g/dL      RDW 15.5 %      Platelets 1,030 K/uL      Comment: RESULTS CHECKED. This result has been called to DR ITALIA ZABALA by Raoul Sorto on 06 11 23 at 07:59, and has been read back.         MPV 10.6 fL      Differential Type Manu     Neutrophils 69 %      Lymphocytes 28 %      Monocytes 2 %      Eosinophils 1 %      Basophils 0 %       Neutrophils, Absolute 6.20 K/uL      Lymphocytes, Absolute 2.52 K/uL      Monocytes, Absolute 0.18 K/uL      Eosinophils, Absolute 0.09 K/uL      Basophils, Absolute 0.00 K/uL      PLT Morphology Normal     Platelet Estimate Increased (>400,000)     Ovalocytes Occasional     Poikilocytes 1+     Schistocytes Occasional     Plains Cells 1+    Extra Tube Lt Blue [[id number]] Collected: 06/11/23 0654    Specimen: Blood, Venous Updated: 06/11/23 0718    Extra Tube Gold [[id number]] Collected: 06/11/23 0654    Specimen: Blood, Venous Updated: 06/11/23 0718    Extra Tube Lt Green [[id number]] Collected: 06/11/23 0654    Specimen: Blood, Venous Updated: 06/11/23 0718    Extra Tubes [[id number]] Collected: 06/11/23 0654    Specimen: Blood, Venous Updated: 06/11/23 0717    Narrative:      The following orders were created for panel order Extra Tubes.  Procedure                               Abnormality         Status                     ---------                               -----------         ------                     Extra Tube Lt Blue[[id number]]                               In process                 Extra Tube Lt Green[[id number]]                              In process                 Extra Tube Pink BB[[id number]]                               In process                 Extra Tube Gold[[id number]]                                  In process                   Please view results for these tests on the individual orders.    Extra Tube Pink BB [[id number]] Collected: 06/11/23 0654    Specimen: Blood, Venous Updated: 06/11/23 0717          Imaging Results          ULTRASOUND ABDOMEN LIMITED (Final result)  Result time 06/11/23 13:24:54    Final result                 Impression:    IMPRESSION: Normal spleen    COMMENT: Ultrasound of the spleen was performed.  Spleen is normal in size,  echotexture and echogenicity.  Spleen measures 7.4 x 4 x 7.6 cm, for an  estimated volume of 117 mL.                 Narrative:    CLINICAL  HISTORY: eval for splenomegaly, plts >1mil                               MRI ANGIOGRAM HEAD WITHOUT CONTRAST (In process)                MRI BRAIN WITHOUT CONTRAST (In process)  Result time 06/11/23 11:50:27               ULTRASOUND PREGNANCY < 14 WEEKS TRANSABDOMINAL LIMITED (Final result)  Result time 06/11/23 10:09:06    Final result                 Impression:    IMPRESSION:   Single live intrauterine pregnancy with approximate estimated  gestational age of 9 weeks 2 days.. Follow-up with OB/GYN.                 Narrative:    CLINICAL HISTORY: First trimester pregnancy, pain    TECHNIQUE:  Real time ultrasound of the pelvis was performed utilizing  transabdominal complete and transvaginal imaging for improved evaluation of the  endometrium and ovaries.  2D and color flow and spectral Doppler imaging was  performed.    PRIOR STUDIES:  None    COMMENT:  The uterus measures 9.5 x 5.0 x 7.1 cm.    There is a single gestational sac that measures 3.8 cm, giving an estimated  gestational age of 9 weeks 1 day and appears normal in shape.  The crown-rump length measures2.7 cm, giving an estimated gestational age of 9  weeks 3 days. There is a normal yolk sac.    The fetal heart tones are identified at 168 beats per minute.    The fetal and placental parts appear grossly normal.      The right ovary measures 3.9 x 2.1 x 2.8 cm    The left ovary measures 2.8 x 1.4 x 2.0 cm.    There is no free fluid in the cul-de-sac.    The endometrial stripe and ovaries are better seen utilizing transvaginal  technique.                                 ULTRASOUND PREGNANCY TRANSVAGINAL ONLY (Final result)  Result time 06/11/23 10:09:06    Final result                 Impression:    IMPRESSION:   Single live intrauterine pregnancy with approximate estimated  gestational age of 9 weeks 2 days.. Follow-up with OB/GYN.                 Narrative:    CLINICAL HISTORY: First trimester pregnancy, pain    TECHNIQUE:  Real time ultrasound of the  pelvis was performed utilizing  transabdominal complete and transvaginal imaging for improved evaluation of the  endometrium and ovaries.  2D and color flow and spectral Doppler imaging was  performed.    PRIOR STUDIES:  None    COMMENT:  The uterus measures 9.5 x 5.0 x 7.1 cm.    There is a single gestational sac that measures 3.8 cm, giving an estimated  gestational age of 9 weeks 1 day and appears normal in shape.  The crown-rump length measures2.7 cm, giving an estimated gestational age of 9  weeks 3 days. There is a normal yolk sac.    The fetal heart tones are identified at 168 beats per minute.    The fetal and placental parts appear grossly normal.      The right ovary measures 3.9 x 2.1 x 2.8 cm    The left ovary measures 2.8 x 1.4 x 2.0 cm.    There is no free fluid in the cul-de-sac.    The endometrial stripe and ovaries are better seen utilizing transvaginal  technique.                                 CT HEAD WITHOUT IV CONTRAST (Final result)  Result time 06/11/23 09:10:19    Final result                 Impression:    IMPRESSION:  1. No evidence of hemorrhage, mass or acute infarct.  2. Extensive paranasal sinus disease. Please correlate for acute sinusitis.  3. Partially empty sella which is nonspecific and usually a congenital variant  however can be seen with intracranial hypertension in the proper clinical  setting.    COMMENT:    Technique: Computed tomography of the brain was performed utilizing contiguous  2.5 mm transaxial sections without intravenous contrast administration.    CT DOSE:  One or more dose reduction techniques (e.g. automated exposure  control, adjustment of the mA and/or kV according to patient size, use of  iterative reconstruction technique) utilized for this examination.    Comparison studies: None.    Postsurgical change: None.    Brain parenchyma: There is no intraparenchymal hemorrhage, mass effect, midline  shift or extra-axial fluid collection. 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  White matter changes: Normal for age  Ventricles, cisterns, and sulci: Normal in size and configuration.  Sella and cerebellar tonsils: There is a partially empty sella..      Calvarium and extra cranial soft tissues: Normal.  Paranasal sinuses: Extensive opacification of the left frontal, ethmoid,  maxillary sinuses with associated fluid levels..  Mastoid air cell: Normal  Orbits: Normal                 Narrative:    CLINICAL HISTORY: Headache, sudden, severe                    Preliminary Interpretation      Patient Name: Alyson Up  Exam(s): head CT  MR#: [id number]    History: headache    Preliminary Impression:    Comparison: None available    No evidence of hemorrhage, mass-effect or acute territorial infarction by CT criteria.  No acute calvarial fracture.  Partially empty sella, a nonspecific finding, often a developmental variant.  Extensive opacification of the left ethmoid air cells, frontal, sphenoid sinuses and partially visualized bilateral maxillary sinuses with mucosal thickening and fluid secretions; please correlate clinically for acute sinusitis.            Interpreted by: Saravanan Tucker MD, Jun 11, 2023 07:14 AM                                No orders to display       Scoring tools                                  ED Course & MDM   MDM / ED COURSE / CLINICAL IMPRESSION / DISPO     Medical Decision Making  Acute non intractable tension-type headache: undiagnosed new problem with uncertain prognosis  Thrombocytosis: undiagnosed new problem with uncertain prognosis  Amount and/or Complexity of Data Reviewed  Independent Historian: friend     Details: Patient's friend at bedside  External Data Reviewed: labs and notes.     Details: Reviewed notes from urgent care  Labs: ordered. Decision-making details documented in ED Course.  Radiology: ordered and independent interpretation performed. Decision-making details documented in ED Course.     Details:  Independently reviewed and agree with radiology  Discussion of management or test interpretation with external provider(s): Spoke with hematology/oncology fellow multiple times.  Will speak with hospitalist MD    Risk  Prescription drug management.  Decision regarding hospitalization.          ED Course as of 06/11/23 1336   Sun Jun 11, 2023   0803 Platelets(!!): 1,030  Significant thrombocytosis, patient previously had platelets of 360 in November 2022 [RL]   0847 I spoke with heme-onc fellow, she suggested to add on further labs including a DIC panel/von Willebrand panel/iron and TIBC.    Despite having a thrombocytosis, these patients are paradoxically at risk of bleeding.  We will get MRI/MRV. [RL]   1203 Spoke with heme-onc fellow again, the von Willebrand factor test will take 24 to 48 hours to come back.  The DIC panel is reassuring.  Awaiting MRI but plan to have patient come into the hospital for 1 night to trend platelets [RL]   1335 Awaiting MRI results.  Paged hospitalist for signout [RL]      ED Course User Index  [RL] Nikki Camacho DO     Clinical Impression      None               Nikki Camacho DO  06/14/23 3143

## 2023-06-11 NOTE — H&P
Internal Medicine  History & Physical        CHIEF COMPLAINT   Headache   HISTORY OF PRESENT ILLNESS      This is a 27 y.o. female with no past medical history who is currently 9 weeks pregnant who presents for chief complaint of headache.     She states 5 days ago she began with left posterior headache that has been gradually worsening since onset. Constant throbbing and occasional radiates towards left side of face/left retro-orbital region. Gets temporary relief with Tylenol. Remainder of ROS completely negative. Denies any nausea, vision changes, focal weakness, numbness/tingling, unsteady gait, dizziness/lightheadedness, fever/chills, neck pain. Denies preceding injury. Says that years ago she used to get frequent headaches but those were always frontal. Does not take any daily medications or supplements.      Of note, she recently traveled to [facility] and returned on May 29th due to having a sore throat. Upon arriving home, tested positive for strep and was treated with penicillin. States she no longer is having sore throat. Does have some residual nasal congestion but this is improving. She is from [place] and is visiting [place] for her little brother's graduation.     In the ED, vitals all stable. Labs remarkable for Hgb 11.2, platelets >1000, path smear with thrombocytosis and no dyspoiesis or blasts. MRI brain consistent with pseudotumor cerebri. US of the spleen was normal. Treated with dexamethasone, benadryl, reglan, and 1L IVF. ED spoke with heme and neurology.     PAST MEDICAL AND SURGICAL HISTORY      PMHx:  History reviewed. No pertinent past medical history.    PSHx:  History reviewed. No pertinent surgical history.    PCP:   Pt States, No Pcp    MEDICATIONS      Prior to Admission medications    Medication Sig Start Date End Date Taking? Authorizing Provider   docusate sodium (COLACE) 100 mg capsule Take 100 mg by mouth. 11/10/22  Yes Provider, ginger Medina MD       Home medications  were personally reviewed.    ALLERGIES      Patient has no known allergies.    FAMILY HISTORY      History reviewed. No pertinent family history.    SOCIAL HISTORY      Social History     Socioeconomic History    Marital status: Single     Spouse name: None    Number of children: None    Years of education: None    Highest education level: None   Tobacco Use    Smoking status: Never    Smokeless tobacco: Never   Substance and Sexual Activity    Alcohol use: Never    Drug use: Never    Sexual activity: Defer     Social Determinants of Health     Food Insecurity: No Food Insecurity (6/11/2023)    Hunger Vital Sign     Worried About Running Out of Food in the Last Year: Never true     Ran Out of Food in the Last Year: Never true       REVIEW OF SYSTEMS      Review of Systems  As above HPI  PHYSICAL EXAMINATION      Temp:  [36.6 °C (97.9 °F)] 36.6 °C (97.9 °F)  Heart Rate:  [75-95] 90  Resp:  [17-20] 18  BP: (112-132)/(61-83) 120/65  Body mass index is 23.96 kg/m².    Physical Exam  Constitutional:       General: She is not in acute distress.     Appearance: Normal appearance. She is not ill-appearing or toxic-appearing.   HENT:      Head: Normocephalic.      Nose: Nose normal.      Mouth/Throat:      Mouth: Mucous membranes are moist.      Pharynx: Oropharynx is clear. No oropharyngeal exudate or posterior oropharyngeal erythema.   Eyes:      General: No scleral icterus.     Extraocular Movements: Extraocular movements intact.      Conjunctiva/sclera: Conjunctivae normal.      Pupils: Pupils are equal, round, and reactive to light.   Cardiovascular:      Rate and Rhythm: Normal rate and regular rhythm.      Pulses: Normal pulses.      Heart sounds: Normal heart sounds.   Pulmonary:      Effort: Pulmonary effort is normal.      Breath sounds: Normal breath sounds.   Abdominal:      General: Abdomen is flat. There is no distension.      Palpations: Abdomen is soft.      Tenderness: There is no abdominal  tenderness.   Musculoskeletal:      Cervical back: Normal range of motion and neck supple. No rigidity or tenderness.      Right lower leg: No edema.      Left lower leg: No edema.   Skin:     General: Skin is warm and dry.   Neurological:      General: No focal deficit present.      Mental Status: She is alert and oriented to person, place, and time. Mental status is at baseline.      Cranial Nerves: No cranial nerve deficit.      Sensory: No sensory deficit.      Motor: No weakness.   Psychiatric:         Mood and Affect: Mood normal.         Behavior: Behavior normal.         Thought Content: Thought content normal.         LABS / IMAGING / EKG        Labs:  Lab Results   Component Value Date    WBC 8.71 06/11/2023    HGB 10.9 (L) 06/11/2023    HCT 35.3 06/11/2023    MCV 82.7 (L) 06/11/2023    PLT 1,039 (HH) 06/11/2023         Microbiology Data personally reviewed:  Microbiology Results     ** No results found for the last 720 hours. **          Imaging personally reviewed(does not include unread studies):  MRI BRAIN WITHOUT CONTRAST    Result Date: 6/11/2023  IMPRESSION: 1.  No acute infarct or acute hemorrhage. 2.  Fluid-filled and mildly tortuous optic nerve sheath complexes, partially empty sella and moderate focal stenoses of the transverse sinuses bilaterally just proximal to the transverse sigmoid junction.  Findings are consistent with intracranial hypertension / pseudotumor cerebri.  Correlation with CSF sampling / opening pressure recommended.     MRI ANGIOGRAM HEAD WITHOUT CONTRAST    Result Date: 6/11/2023  IMPRESSION: 1.  No acute infarct or acute hemorrhage. 2.  Fluid-filled and mildly tortuous optic nerve sheath complexes, partially empty sella and moderate focal stenoses of the transverse sinuses bilaterally just proximal to the transverse sigmoid junction.  Findings are consistent with intracranial hypertension / pseudotumor cerebri.  Correlation with CSF sampling / opening pressure recommended.      ULTRASOUND ABDOMEN LIMITED    Result Date: 6/11/2023  IMPRESSION: Normal spleen COMMENT: Ultrasound of the spleen was performed.  Spleen is normal in size, echotexture and echogenicity.  Spleen measures 7.4 x 4 x 7.6 cm, for an estimated volume of 117 mL.     ULTRASOUND PREGNANCY < 14 WEEKS TRANSABDOMINAL LIMITED    Result Date: 6/11/2023  IMPRESSION:   Single live intrauterine pregnancy with approximate estimated gestational age of 9 weeks 2 days.. Follow-up with OB/GYN.     ULTRASOUND PREGNANCY TRANSVAGINAL ONLY    Result Date: 6/11/2023  IMPRESSION:   Single live intrauterine pregnancy with approximate estimated gestational age of 9 weeks 2 days.. Follow-up with OB/GYN.     CT HEAD WITHOUT IV CONTRAST    Result Date: 6/11/2023  IMPRESSION: 1. No evidence of hemorrhage, mass or acute infarct. 2. Extensive paranasal sinus disease. Please correlate for acute sinusitis. 3. Partially empty sella which is nonspecific and usually a congenital variant however can be seen with intracranial hypertension in the proper clinical setting. COMMENT: Technique: Computed tomography of the brain was performed utilizing contiguous 2.5 mm transaxial sections without intravenous contrast administration. CT DOSE:  One or more dose reduction techniques (e.g. automated exposure control, adjustment of the mA and/or kV according to patient size, use of iterative reconstruction technique) utilized for this examination. Comparison studies: None. Postsurgical change: None. Brain parenchyma: There is no intraparenchymal hemorrhage, mass effect, midline shift or extra-axial fluid collection. 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 White matter changes: Normal for age Ventricles, cisterns, and sulci: Normal in size and configuration. Sella and cerebellar tonsils: There is a partially empty sella.. Calvarium and extra cranial soft tissues: Normal. Paranasal sinuses: Extensive opacification of the left frontal,  ethmoid, maxillary sinuses with associated fluid levels.. Mastoid air cell: Normal Orbits: Normal       ECG/Telemetry  reviewed    ASSESSMENT AND PLAN           Headache  Assessment & Plan  5 days of gradually worsening L posterior headache without associated symptoms or neurological deficits  MRI w/o contrast with findings concerning for possible pseudotumor cerebri  DDx includes IIH, venous sinus thrombosis in setting of thrombocytosis, migraine    - Neurology consulted  - Ophthalmology consulted > will need evaluation for papilledema and Optical Coherence Tomography, if concerning for IIH then will need LP as long as acquired VW deficiency is ruled out   - Symptomatic treatment with Tylenol, Reglan for now    * Thrombocytosis  Assessment & Plan  Platelets >1000, previously normal in 300s in 11/2022  Smear with thrombocytosis but no dyspoesis or blasts  US negative for splenomegaly  Iron deficient on labs  Unknown etiology at this time with broad differential including acquired VW deficiency, iron deficiency, chronic inflammatory conditions, malignancy, recent infection, essential thrombocythemia    - Heme consulted  - F/u VW panel, genetic mutation labs  - IV iron   - Heparin for DVT prophylaxis    Pregnancy  Assessment & Plan  9 weeks pregnant with no complications thus far       VTE Assessment: Padua    VTE Prophylaxis: Current anticoagulants:  heparin (porcine) 5,000 unit/mL injection 5,000 Units, subcutaneous, q8h [facility]      Palliative Care Screen:    Code Status: Full Code  Estimated discharge date: 6/13/2023     ATTENDING DOCUMENTATION  ALSO SEE ATTENDING ATTESTATION SECTION OF NOTE

## 2023-06-11 NOTE — PROGRESS NOTES
Patient was not taking any prescription medications prior to admission. Confirmed with patient at bedside.

## 2023-06-12 VITALS
TEMPERATURE: 98.1 F | RESPIRATION RATE: 17 BRPM | SYSTOLIC BLOOD PRESSURE: 121 MMHG | BODY MASS INDEX: 23.91 KG/M2 | HEIGHT: 70 IN | OXYGEN SATURATION: 100 % | HEART RATE: 86 BPM | WEIGHT: 167 LBS | DIASTOLIC BLOOD PRESSURE: 68 MMHG

## 2023-06-12 LAB
ANION GAP SERPL CALC-SCNC: 7 MEQ/L (ref 3–15)
BASOPHILS # BLD: 0.01 K/UL (ref 0.01–0.1)
BASOPHILS NFR BLD: 0.1 %
BUN SERPL-MCNC: 6 MG/DL (ref 8–20)
CALCIUM SERPL-MCNC: 8.9 MG/DL (ref 8.9–10.3)
CHLORIDE SERPL-SCNC: 108 MEQ/L (ref 98–109)
CO2 SERPL-SCNC: 21 MEQ/L (ref 22–32)
CREAT SERPL-MCNC: 0.5 MG/DL (ref 0.6–1.1)
CRP SERPL-MCNC: 8.38 MG/L
DIFFERENTIAL METHOD BLD: ABNORMAL
EOSINOPHIL # BLD: 0.01 K/UL (ref 0.04–0.36)
EOSINOPHIL NFR BLD: 0.1 %
ERYTHROCYTE [DISTWIDTH] IN BLOOD BY AUTOMATED COUNT: 15.8 % (ref 11.7–14.4)
ERYTHROCYTE [SEDIMENTATION RATE] IN BLOOD BY WESTERGREN METHOD: 111 MM/HR
GFR SERPL CREATININE-BSD FRML MDRD: >60 ML/MIN/1.73M*2
GLUCOSE SERPL-MCNC: 99 MG/DL (ref 70–99)
HCT VFR BLDCO AUTO: 35 % (ref 35–45)
HGB BLD-MCNC: 10.7 G/DL (ref 11.8–15.7)
IMM GRANULOCYTES # BLD AUTO: 0.07 K/UL (ref 0–0.08)
IMM GRANULOCYTES NFR BLD AUTO: 0.5 %
LYMPHOCYTES # BLD: 1.68 K/UL (ref 1.2–3.5)
LYMPHOCYTES NFR BLD: 13 %
MCH RBC QN AUTO: 24.9 PG (ref 28–33.2)
MCHC RBC AUTO-ENTMCNC: 30.6 G/DL (ref 32.2–35.5)
MCV RBC AUTO: 81.6 FL (ref 83–98)
MONOCYTES # BLD: 0.88 K/UL (ref 0.28–0.8)
MONOCYTES NFR BLD: 6.8 %
NEUTROPHILS # BLD: 10.32 K/UL (ref 1.7–7)
NEUTS SEG NFR BLD: 79.5 %
NRBC BLD-RTO: 0 %
PDW BLD AUTO: 10.6 FL (ref 9.4–12.3)
PLATELET # BLD AUTO: 1076 K/UL (ref 150–369)
POTASSIUM SERPL-SCNC: 3.8 MEQ/L (ref 3.6–5.1)
RBC # BLD AUTO: 4.29 M/UL (ref 3.93–5.22)
SODIUM SERPL-SCNC: 136 MEQ/L (ref 136–144)
WBC # BLD AUTO: 12.97 K/UL (ref 3.8–10.5)

## 2023-06-12 PROCEDURE — 85025 COMPLETE CBC W/AUTO DIFF WBC: CPT

## 2023-06-12 PROCEDURE — 86147 CARDIOLIPIN ANTIBODY EA IG: CPT

## 2023-06-12 PROCEDURE — 99239 HOSP IP/OBS DSCHRG MGMT >30: CPT | Performed by: INTERNAL MEDICINE

## 2023-06-12 PROCEDURE — 63700000 HC SELF-ADMINISTRABLE DRUG

## 2023-06-12 PROCEDURE — 85652 RBC SED RATE AUTOMATED: CPT | Performed by: STUDENT IN AN ORGANIZED HEALTH CARE EDUCATION/TRAINING PROGRAM

## 2023-06-12 PROCEDURE — 63600000 HC DRUGS/DETAIL CODE

## 2023-06-12 PROCEDURE — 36415 COLL VENOUS BLD VENIPUNCTURE: CPT

## 2023-06-12 PROCEDURE — 63600000 HC DRUGS/DETAIL CODE: Mod: JZ

## 2023-06-12 PROCEDURE — 80048 BASIC METABOLIC PNL TOTAL CA: CPT

## 2023-06-12 PROCEDURE — 99999 PR OFFICE/OUTPT VISIT,PROCEDURE ONLY: CPT | Performed by: INTERNAL MEDICINE

## 2023-06-12 PROCEDURE — 86140 C-REACTIVE PROTEIN: CPT | Performed by: STUDENT IN AN ORGANIZED HEALTH CARE EDUCATION/TRAINING PROGRAM

## 2023-06-12 PROCEDURE — 25800000 HC PHARMACY IV SOLUTIONS

## 2023-06-12 RX ADMIN — ACETAMINOPHEN 650 MG: 325 TABLET, FILM COATED ORAL at 16:10

## 2023-06-12 RX ADMIN — ACETAMINOPHEN 650 MG: 325 TABLET, FILM COATED ORAL at 06:13

## 2023-06-12 RX ADMIN — SODIUM CHLORIDE 125 MG: 9 INJECTION, SOLUTION INTRAVENOUS at 17:05

## 2023-06-12 RX ADMIN — HEPARIN SODIUM 5000 UNITS: 5000 INJECTION, SOLUTION INTRAVENOUS; SUBCUTANEOUS at 06:13

## 2023-06-12 RX ADMIN — ACETAMINOPHEN 650 MG: 325 TABLET, FILM COATED ORAL at 10:51

## 2023-06-12 RX ADMIN — HEPARIN SODIUM 5000 UNITS: 5000 INJECTION, SOLUTION INTRAVENOUS; SUBCUTANEOUS at 14:40

## 2023-06-12 NOTE — DISCHARGE INSTRUCTIONS
Dear MsParmjit Annel,     You presented to [facility] on 6/11 because of headache. You were also noted to have an elevated platelet count.  Regarding your Headache, given your symptoms and significant eye involvement and MRI of your brain that showed you did not have any evidence of a stroke or a bleed.  There was some findings concerning for increased pressure in the brain or intracranial hypertension as well as some findings seen within your optic nerve.  You were seen by neurology who felt that your headache was related to a possible migraine.  Your headache resolved with symptomatic management including steroids and and nausea medications.  At the time of discharge you were no longer having any headaches. You were seen by ophthalmology, who found a normal findings and recommended follow-up with neuro-ophthalmology.    Regarding your elevated platelet count, we had our hematology team evaluate you who sent for several tests and recommend you see a provider urgently in hematology, as discussed.    Please refer to your After Visit Summary (AVS) for all medication information and instructions.    If you have any questions regarding your home medications please contact your primary care physician immediately. If you experience any alarming symptoms, please do not hesitate to call your primary care doctor or present to the emergency department for evaluation.    It was a pleasure taking care of you!    Sincerely,  [facility] Care Team      Important follow-up  Please follow-up with your PCP in 1 week. Please ask them for a referral to [facility].  Please follow-up with hematology regarding your elevated platelet count (thrombocytosis). You can [facility] [phone] [facility] once a referral has been made from your PCP or OB. GYN.   Please follow-up with Neuro-ophthalmology  Please follow-up with a headache clinic.You can inquire about this from your PCP in  order to receive a referral  We recommend repeating lab work (CBC) in 1 week. At the time of your discharge the following lab work had not resulted: JAK2 mutation and Von willebrand disease testing. If any of your doctors have access to EPIC (electronic medical record) will be able to see this labs. Furthermore, you will be able to see your lab work on [employer]. You can continue to follow-up with hematology he may need to repeat these tests.  Thank you for enrolling in [employer]. Please follow the instructions below to securely access your online medical record. BeMyGuest allows you to send messages to your doctor, view your test results, renew your prescriptions, schedule appointments, and more.    How Do I Sign Up?  In your Internet browser, go to [email or web address].  Click on the Sign Up Now link in the New User? box.   Enter your BeMyGuest Activation Code exactly as it appears below. You will not need to use this code after you have completed the sign-up process. If you do not sign up before the expiration date, you must request a new code.  BeMyGuest Activation Code: Activation code not generated  Current [facility] BeMyGuest Status: Account disabled    Enter the last four digits of your Social Security Number and your Date of Birth as indicated and click Next. You will be taken to the next sign-up page.  Create a BeMyGuest username. Think of one that is secure and easy to remember.  Create a BeMyGuest password. You can change your password at any time.  Choose a security question, enter your answer, and click Next. This can be used to access BeMyGuest if you forget your password.   Select your communication preference. Enter a valid e-mail address if you would like to receive e-mail notifications when new information is available in BeMyGuest.  Click Sign In. You can now view your medical record.     Additional Information  If you have questions, you can e-mail [email or web address] or call  [phone] to talk to our MyChart staff. Remember, MyChart is NOT to be used for urgent needs. For medical emergencies, dial 911.

## 2023-06-12 NOTE — CONSULTS
"   Hematology/Oncology  Consult Note       History of Present Illness     Indication for Consultation: \"thrombocytosis\"    Ordering physician: Diana Power MD  Consulting Physician: Dr. Rivas    History of Present Illness:  Ms. Up is a 27-year-old woman (currently 9 weeks pregnant) who presented to the hospital with a 4-day history of left ear pain that was somewhat relieved by Tylenol.  Since the onset of the pain, she has not experienced any fevers, chills, headache, dizziness, vision abnormalities, abdominal pain, nausea, vomiting, rash, shortness of breath, chest pain.  She did however report of a left-sided headache that radiated to the scalp which lasted for about a day.  Given the persistence of her symptoms, she presented to the emergency department for further evaluation.  In the emergency department, she was discovered to have an elevated platelet count of 1 million for which hematology and oncology was consulted.    She is in [place] to attend her brother's high school graduation. In regards to OBGYN history, she reports that she had a miscarriage at 8 weeks for her first pregnancy. She denies any history of VTE in the past.       Labs:  -PT: 13.2  -INR: 1  -PTT: Mildly elevated at 37  -CRP: 8.38  -ESR: 111   -Fibrinogen: 582  -Iron saturation 9%, iron 30, TIBC 328  -Ferritin 38        Blood smear:  Marked thrombocytosis.  Rule out myeloproliferative neoplasm.  Microcytic anemia.  Rule out iron deficiency anemia and blood loss.  Normal white blood cell count with a preponderance of neutrophils.  No definite evidence of dyspoiesis or blasts    Review of Systems:   As per HPI    Past Medical History:  History reviewed. No pertinent past medical history.    Surgical History:  History reviewed. No pertinent surgical history.    Family History:  History reviewed. No pertinent family history.    Social History:  Social History     Socioeconomic History    Marital status: Single     Spouse name: " None    Number of children: None    Years of education: None    Highest education level: None   Tobacco Use    Smoking status: Never    Smokeless tobacco: Never   Substance and Sexual Activity    Alcohol use: Never    Drug use: Never    Sexual activity: Defer     Social Determinants of Health     Food Insecurity: No Food Insecurity (6/11/2023)    Hunger Vital Sign     Worried About Running Out of Food in the Last Year: Never true     Ran Out of Food in the Last Year: Never true       Home Medications:  Not in a hospital admission.    Current Medications:    Current Facility-Administered Medications:     acetaminophen (TYLENOL) tablet 650 mg, 650 mg, oral, q6h PRN, [place], Ash, DO, 650 mg at 06/12/23 0613    glucose chewable tablet 16-32 g of dextrose, 16-32 g of dextrose, oral, PRN **OR** dextrose 40 % oral gel 15-30 g of dextrose, 15-30 g of dextrose, oral, PRN **OR** glucagon (GLUCAGEN) injection 1 mg, 1 mg, intramuscular, PRN **OR** dextrose 50 % in water (D50) injection 12.5 g, 25 mL, intravenous, PRN, Jp, Ash, DO    heparin (porcine) 5,000 unit/mL injection 5,000 Units, 5,000 Units, subcutaneous, q8h HO, Ash Trammell, DO, 5,000 Units at 06/12/23 0613    [Provider Managed Hold] metoclopramide (REGLAN) injection 5 mg, 5 mg, intravenous, q6h PRN, Jp, Ash, DO  No current outpatient medications on file.    Allergies: Patient has no known allergies.    OBJECTIVE      Vitals:    06/12/23 0346   BP: (!) 104/58   Pulse: 79   Resp: 20   Temp:    SpO2: 97%       Wt Readings from Last 3 Encounters:   06/11/23 75.8 kg (167 lb)   06/11/23 75.8 kg (167 lb)         PHYSICAL EXAMINATION        Physical Exam  Vitals reviewed.   HENT:      Head: Normocephalic and atraumatic.      Mouth/Throat:      Mouth: Mucous membranes are moist.   Eyes:      Conjunctiva/sclera: Conjunctivae normal.   Cardiovascular:      Rate and Rhythm: Normal rate.      Heart sounds: Normal heart sounds.   Pulmonary:       Breath sounds: Normal breath sounds.   Abdominal:      General: Bowel sounds are normal.   Musculoskeletal:      Right lower leg: No edema.      Left lower leg: No edema.   Skin:     General: Skin is warm.   Neurological:      Mental Status: She is alert.      Motor: No weakness.   Psychiatric:         Mood and Affect: Mood normal.         Behavior: Behavior normal.          DATA      Labs:     CMP:  CMP Results       06/12/23 06/11/23     0606 0654     134    K 3.8 3.6    Cl 108 103    CO2 21 23    Glucose 99 94    BUN 6 6    Creatinine 0.5 0.6    Calcium 8.9 9.0    Anion Gap 7 8    AST -- 22    ALT -- 14    Albumin -- 2.9    EGFR >60.0 >60.0         Comment for K at 0606 on 06/12/23: Results obtained on plasma. Plasma Potassium values may be up to 0.4 mEQ/L less than serum values. The differences may be greater for patients with high platelet or white cell counts.    Comment for K at 0654 on 06/11/23: Results obtained on plasma. Plasma Potassium values may be up to 0.4 mEQ/L less than serum values. The differences may be greater for patients with high platelet or white cell counts.          CBC:  CBC Results       06/12/23 06/11/23 06/11/23     0606 1005 0811    WBC 12.97 8.71 7.90    RBC 4.29 4.27 4.39    HGB 10.7 10.9 11.1    HCT 35.0 35.3 36.4    MCV 81.6 82.7 82.9    MCH 24.9 25.5 25.3    MCHC 30.6 30.9 30.5    PLT 1,076 1,039 998         Comment for PLT at 0606 on 06/12/23: This result has been called to JERRY MAI by GINA WRAY on 06 12 23 at 07:04, and has been read back.     Comment for PLT at 1005 on 06/11/23: CONSISTENT WITH PREVIOUS RESULTS. This result has been called to DR ITALIA ZABALA by Raoul Sorto on 06 11 23 at 11:07, and has been read back.     Comment for PLT at 0811 on 06/11/23: CONSISTENT WITH PREVIOUS RESULTS          Micro:  Microbiology Results     ** No results found for the last 720 hours. **          Imaging: reviewed the last 24 hours         ASSESSMENT AND PLAN       Ms. Up is a 27-year-old woman (currently 9 weeks pregnant) here with thrombocytosis     #Thrombocytosis; concern for myeloproliferative neoplasm  #First trimester pregnancy  #History of miscarriage during firs pregnancy   #MRI brain concerning for intracranial hypertension/pseudotumor cerebri  #Migraine headache    Her ultrasound abdomen showed a normal spleen, MRI of the brain did not show any acute infarct or acute hemorrhage however there was concern for intracranial hypertension/pseudotumor cerebri.  Neurology was consulted and recommended ophthalmology consult to obtain with CT and dilated eye examination to screen for elevated intracranial pressure.    Transvaginal and pelvic ultrasound confirmed single live intrauterine pregnancy with gestational age of 9 weeks 2 days    Given her platelet count over 1 million, ruling out an acquired von Willebrand disease will be of utmost importance prior to starting aspirin    Once essential thrombocythemia is confirmed, risk stratification will have to be performed with the International Prognostic Score of thrombosis in World Health Organization essential thrombocythemia (IPSET-thrombosis) score:  · High-risk disease - History of thrombosis at any age and/or age >60 with a JAK2 V617F mutation  · Intermediate-risk disease - Age >60, no JAK2 mutation detected, and no history of thrombosis  · Low-risk disease - Age <=60 with JAK2 mutation and no history of thrombosis  · Very low-risk disease - Age <=60, no JAK2 mutation detected, and no history of thrombosis      Plan:  -Follow-up JAK2 cascade testing  -Follow-up von Willebrand disease panel  -Follow-up daily CBC  -Follow-up with neurology and ophthalmology consultation  -Treatment for ET in pregnancy will include Pegylated Interferon-Alpha which is the treatment of choice during pregnancy (SUBQ: 90 mcg once weekly; adjust dose based on response or tolerance (doses ranged from 45 mcg once every 2 to 4 weeks to 90 mcg  once weekly); continue as long as clinically benefiting).       Please page with questions. Attending attestation to follow.     Neil Vega MD  Hematology and Medical Oncology Fellow   Pager #[phone]         ATTENDING DOCUMENTATION  ALSO SEE ATTENDING ATTESTATION SECTION OF NOTE

## 2023-06-12 NOTE — STUDENT
Internal Medicine  Daily Progress Note       SUBJECTIVE   This is a 27 y.o. year-old female admitted on 6/11/2023 with Thrombocytosis [D75.839].    27 year old female who is currently 9 weeks pregnant presented to the ED for a headache. Patient reports that Wednesday she had a constant left sided headache that originated in the occipital region and moved to the left frontoparietal scalp. She described the pain to be 10/10 when she had the headache and described the headache pain to be sharp and throbbing. She tried Tylenol but did not have relief, prompting her to come to the ED. She reports she had some photophobia with this headache and mild blurry vision requiring her to squint but no flashers, floaters, double vision. At baseline patient has 20/20 vision and does not wear contacts or glasses. Denies any other associated symptoms such as changes in hearing, changes in speech,  changes in gait or balance, changes in bowel/bladder control, nausea/vomiting,dizziness, lightheadedness, weakness, paraesthesias. She states that she had to lie on her left side to feel more comfortable but denies any significant positional changes that made the pain better or worse. She reports that she has never been formally diagnosed with migraines but does suffer from occasional headaches that are typically relieved by the use of tylenol. Patient had traveled to [facility] about 2 weeks ago and had strep throat which has since resolved.     PMHx: Patient denies any significant past medical history. Up to date on vaccinations.   Medications: Denies any medications and is currently only taking multivitamins.   Family Hx: Denies any significant family history of neurologic malignancies.   Social Hx: Works from home in [email or web address]. Lives in [place] and is visiting [place] for her younger brother's high school graduation. Denies smoking, alcohol, or illicit drug use.     In the ED, vitals all stable. Labs remarkable for Hgb 11.2,  platelets >1000, path smear with thrombocytosis and no dyspoiesis or blasts. MRI brain consistent with pseudotumor cerebri. US of the spleen was normal. Treated with dexamethasone, benadryl, reglan, and 1L IVF. ED spoke with heme and neurology.     Interval History: This morning, patient offers no complaints. Reports that her headache has resolved and she has no pain. Denies any chest pain, SOB, abdominal pain, n/v/d, urinary or bowel symptoms.      OBJECTIVE      Vital signs in last 24 hours:  Temp:  [36.7 °C (98.1 °F)] 36.7 °C (98.1 °F)  Heart Rate:  [] 79  Resp:  [18-22] 20  BP: ()/(52-72) 104/58  Temp (24hrs), Av.7 °C (98.1 °F), Min:36.7 °C (98.1 °F), Max:36.7 °C (98.1 °F)    Intake/Output     Intake Evening 23 - 239 Night 23 - 23 0659 Day 23 - 23 1459 Output Evening 23 - 239 Night 23 - 2359 Day 23 - 23 1459    IV Piggyback 100 -- --                    Total 100 -- -- Total -- -- --   Last 3 shifts --  Intake: 100       Output: 0       Net: 100         PHYSICAL EXAMINATION      Physical Exam  Constitutional:       General: She is not in acute distress.     Appearance: She is not ill-appearing.   HENT:      Head: Normocephalic and atraumatic.      Mouth/Throat:      Mouth: Mucous membranes are moist.   Eyes:      General: No visual field deficit.     Pupils: Pupils are equal, round, and reactive to light.   Cardiovascular:      Rate and Rhythm: Regular rhythm.      Heart sounds: Normal heart sounds. No murmur heard.  Pulmonary:      Effort: No respiratory distress.      Breath sounds: Normal breath sounds. No wheezing.   Abdominal:      General: Bowel sounds are normal. There is no distension.      Palpations: Abdomen is soft.      Tenderness: There is no abdominal tenderness.   Musculoskeletal:      Cervical back: No rigidity or tenderness.      Right lower leg: No edema.      Left  lower leg: No edema.   Skin:     General: Skin is warm and dry.   Neurological:      Mental Status: She is oriented to person, place, and time.      Cranial Nerves: No cranial nerve deficit.      Sensory: No sensory deficit.      Motor: No weakness, tremor, abnormal muscle tone or pronator drift.      Coordination: Finger-Nose-Finger Test normal.      Deep Tendon Reflexes: Reflexes are normal and symmetric.          LINES, CATHETERS, DRAINS, AIRWAYS, AND WOUNDS   Lines, Drains, Airways, Wounds:  Peripheral IV (Adult) 06/11/23 Anterior;Left Forearm (Active)   Number of days: 1       Comments:      LABS / IMAGING / TELE      Labs  CBC with Diff  WBC   Date Value Ref Range Status   06/12/2023 12.97 (H) 3.80 - 10.50 K/uL Final     RBC   Date Value Ref Range Status   06/12/2023 4.29 3.93 - 5.22 M/uL Final     HGB   Date Value Ref Range Status   06/12/2023 10.7 (L) 11.8 - 15.7 g/dL Final     HCT   Date Value Ref Range Status   06/12/2023 35.0 35.0 - 45.0 % Final     MCV   Date Value Ref Range Status   06/12/2023 81.6 (L) 83.0 - 98.0 fL Final     MCH   Date Value Ref Range Status   06/12/2023 24.9 (L) 28.0 - 33.2 pg Final     MCHC   Date Value Ref Range Status   06/12/2023 30.6 (L) 32.2 - 35.5 g/dL Final     RDW   Date Value Ref Range Status   06/12/2023 15.8 (H) 11.7 - 14.4 % Final     PLT   Date Value Ref Range Status   06/12/2023 1,076 (HH) 150 - 369 K/uL Final     Comment:     This result has been called to JERRY MAI by GINA WRAY on 06 12 23 at 07:04, and has been read back.      MPV   Date Value Ref Range Status   06/12/2023 10.6 9.4 - 12.3 fL Final     NEUTROABS   Date Value Ref Range Status   06/12/2023 10.32 (H) 1.70 - 7.00 K/uL Final     MONOABS   Date Value Ref Range Status   06/12/2023 0.88 (H) 0.28 - 0.80 K/uL Final     EOSABS   Date Value Ref Range Status   06/12/2023 0.01 (L) 0.04 - 0.36 K/uL Final     BASOABS   Date Value Ref Range Status   06/12/2023 0.01 0.01 - 0.10 K/uL Final     NEUTROPHILS    Date Value Ref Range Status   06/12/2023 79.5 % Final     LYMPHOCYTES   Date Value Ref Range Status   06/12/2023 13.0 % Final     MONOCYTES   Date Value Ref Range Status   06/12/2023 6.8 % Final     EOSINOPHILS   Date Value Ref Range Status   06/12/2023 0.1 % Final     BASOPHILS   Date Value Ref Range Status   06/12/2023 0.1 % Final     CMP Results       06/12/23 06/11/23     0606 0654     134    K 3.8 3.6    Cl 108 103    CO2 21 23    Glucose 99 94    BUN 6 6    Creatinine 0.5 0.6    Calcium 8.9 9.0    Anion Gap 7 8    AST -- 22    ALT -- 14    Albumin -- 2.9    EGFR >60.0 >60.0         Comment for K at 0606 on 06/12/23: Results obtained on plasma. Plasma Potassium values may be up to 0.4 mEQ/L less than serum values. The differences may be greater for patients with high platelet or white cell counts.    Comment for K at 0654 on 06/11/23: Results obtained on plasma. Plasma Potassium values may be up to 0.4 mEQ/L less than serum values. The differences may be greater for patients with high platelet or white cell counts.        Fibrinogen 582   Sed Rate 111    Imaging personally reviewed(does not include unread studies):  MRI BRAIN WITHOUT CONTRAST    Result Date: 6/11/2023  IMPRESSION: 1.  No acute infarct or acute hemorrhage. 2.  Fluid-filled and mildly tortuous optic nerve sheath complexes, partially empty sella and moderate focal stenoses of the transverse sinuses bilaterally just proximal to the transverse sigmoid junction.  Findings are consistent with intracranial hypertension / pseudotumor cerebri.  Correlation with CSF sampling / opening pressure recommended.     MRI ANGIOGRAM HEAD WITHOUT CONTRAST    Result Date: 6/11/2023  IMPRESSION: 1.  No acute infarct or acute hemorrhage. 2.  Fluid-filled and mildly tortuous optic nerve sheath complexes, partially empty sella and moderate focal stenoses of the transverse sinuses bilaterally just proximal to the transverse sigmoid junction.  Findings are  consistent with intracranial hypertension / pseudotumor cerebri.  Correlation with CSF sampling / opening pressure recommended.     ULTRASOUND ABDOMEN LIMITED    Result Date: 6/11/2023  IMPRESSION: Normal spleen COMMENT: Ultrasound of the spleen was performed.  Spleen is normal in size, echotexture and echogenicity.  Spleen measures 7.4 x 4 x 7.6 cm, for an estimated volume of 117 mL.     ULTRASOUND PREGNANCY < 14 WEEKS TRANSABDOMINAL LIMITED    Result Date: 6/11/2023  IMPRESSION:   Single live intrauterine pregnancy with approximate estimated gestational age of 9 weeks 2 days.. Follow-up with OB/GYN.     ULTRASOUND PREGNANCY TRANSVAGINAL ONLY    Result Date: 6/11/2023  IMPRESSION:   Single live intrauterine pregnancy with approximate estimated gestational age of 9 weeks 2 days.. Follow-up with OB/GYN.     CT HEAD WITHOUT IV CONTRAST    Result Date: 6/11/2023  IMPRESSION: 1. No evidence of hemorrhage, mass or acute infarct. 2. Extensive paranasal sinus disease. Please correlate for acute sinusitis. 3. Partially empty sella which is nonspecific and usually a congenital variant however can be seen with intracranial hypertension in the proper clinical setting. COMMENT: Technique: Computed tomography of the brain was performed utilizing contiguous 2.5 mm transaxial sections without intravenous contrast administration. CT DOSE:  One or more dose reduction techniques (e.g. automated exposure control, adjustment of the mA and/or kV according to patient size, use of iterative reconstruction technique) utilized for this examination. Comparison studies: None. Postsurgical change: None. Brain parenchyma: There is no intraparenchymal hemorrhage, mass effect, midline shift or extra-axial fluid collection. 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 White matter changes: Normal for age Ventricles, cisterns, and sulci: Normal in size and configuration. Sella and cerebellar tonsils: There is a partially empty  sella.. Calvarium and extra cranial soft tissues: Normal. Paranasal sinuses: Extensive opacification of the left frontal, ethmoid, maxillary sinuses with associated fluid levels.. Mastoid air cell: Normal Orbits: Normal       ASSESSMENT AND PLAN      Headache  Assessment & Plan  5 days of gradually worsening L posterior headache without associated symptoms or neurological deficits. DDx includes IIH, venous sinus thrombosis in setting of thrombocytosis, migraine    IIH - patient does not clinically present with any progressive visual disturbances, 6th cranial nerve palsy, tinnitus, exacerbating positional symptoms but MRI w/o contrast with findings concerning for possible pseudotumor cerebri    Cerebral venous sinus thrombosis - patient presenting with constant L sided headache for 5 days and has risk factor for hypercoagulability state being pregnant. Also need to consider causes such as deficiency of antithrombin III, protein C, and protein S; factor V Leiden positivity.     Migraine - patient had associated symptoms of photophobia and symptoms mildly improved with lying in a dark room and Tylenol at home. In the hospital, pt's symptoms have markedly improved with abortive treatment with IV Decadron + Benadryl + Reglan.     - Neurology consulted, f/u neuro recs  - Ophthalmology consulted > will need evaluation for papilledema and Optical Coherence Tomography, if concerning for IIH then will need LP, f/u ophtho recs  - Symptomatic treatment with Tylenol    * Thrombocytosis  Assessment & Plan  Platelets >1000, previously normal in 300s in 11/2022  Smear with thrombocytosis but no dyspoesis or blasts  US negative for splenomegaly  Iron deficient on labs  DIC panel with elevated fibrinogen  Unknown etiology at this time with broad differential. Diff dx of thrombocytosis include: Essential thrombocytopenia,  Anemia/blood loss - Iron deficiency, hemolysis, Infection - Viral, bacterial, mycobacterial, and fungal causes,  Non-infectious inflammation - Malignancy, rheumatologic conditions    - Heme consulted  - F/u VW panel, genetic mutation labs  - IV iron   - Heparin for DVT prophylaxis    Pregnancy  Assessment & Plan  9 weeks pregnant with no complications thus far    -multivitamin and f/u for prenatal care outpatient        VTE Assessment: [place]    VTE Prophylaxis: Current anticoagulants:  heparin (porcine) 5,000 unit/mL injection 5,000 Units, subcutaneous, q8h HO      Code Status: Full Code  Estimated discharge date: 6/13/2023     ATTENDING DOCUMENTATION  ALSO SEE ATTENDING ATTESTATION SECTION OF NOTE

## 2023-06-12 NOTE — STUDENT
Internal Medicine  Inpatient Discharge Summary        BRIEF OVERVIEW   Admitting Provider: Ching Khanna MD  Attending Provider: Diana Power MD Attending phys phone: [phone]    PCP: Matt Alvarez, No Pcp [phone]    Admission Date: 2023  Discharge Date: 2023     DISCHARGE DIAGNOSES      Primary Discharge Diagnosis  Thrombocytosis    Secondary Discharge Diagnoses  Active Hospital Problems    Diagnosis Date Noted    Headache 2023     Priority: High    Thrombocytosis 2023     Priority: Medium    Pregnancy 2023      Resolved Hospital Problems   No resolved problems to display.       Active Problem List on Day of Discharge  Patient Active Problem List   Diagnosis    Thrombocytosis    Headache    Pregnancy     SUMMARY OF HOSPITALIZATION      Presenting Problem/History of Present Illness  This is a 27 y.o. year-old female admitted on 2023 with Thrombocytosis [D75.839].    27 year old female with no significant medical history,  who is currently 9 weeks pregnant presented to the ED on  for worsening left sided headache pain for the past 5 days. Patient reported a constant 10/10 sharp, throbbing headache pain that originated in the left occipital region and moved to the left frontoparietal scalp with associated symptoms of photophobia and blurry vision. Denied any other associated symptoms such as changes in hearing, changes in speech,  changes in gait or balance, changes in bowel/bladder control, nausea/vomiting,dizziness, lightheadedness, weakness, paraesthesias, exacerbating or alleviating positional changes, visual disturbances such as scotomas, flashers, floaters, double vision.  Patient tried Tylenol but only had mild symptomatic relief, prompting her to come to the ED.     In the ED, patient's vitals were stable temp 97.9, HR 77, RR 19, /83 SpO2 98% Labs were remarkable for Hgb 11.2, platelets 1030, MCV 80.7, RDW 15.5, iron 30, iron  saturation 9, PTT 37 and was treated with dexamethasone, benadryl, reglan, and 1L IVF. CT head w/o contrast, MRI brain w/o contrast and MRI angiogram head w/o contrast were ordered and consults to neurology and hematology were placed. Patient was admitted to medicine for further workup and management.     Hospital Course  #Headache   Patient presented with 5 days of gradually worsening left-sided posterior headache radiating to the frontal-parietal region with associated with photophobia and mild blurry vision. In the ED, patient's vitals were stable. Labs were remarkable for Hgb 11.2, platelets 1030, MCV 80.7, RDW 15.5, iron 30, iron saturation 9, PTT 37 and was treated with dexamethasone, benadryl, reglan, and 1L IVF.     Imaging done during patient's hospital course included:  CT head w/o IV contrast that showed no evidence of hemorrhage, mass or acute infarct, Extensive paranasal sinus disease, and partially empty sella, normal ventricles, cisterns, and sulci. an abdominal ultrasound that showed   MRI Brain w/o contrast and MRI Angiogram head w/o contrast  showed No acute infarct or acute hemorrhage. Fluid-filled and mildly tortuous optic nerve sheath complexes, partially empty sella and moderate focal stenoses of the transverse sinuses bilaterally just proximal to the transverse sigmoid junction. Findings consistent with intracranial hypertension / pseudotumor cerebri.     Imaging was concerning for pseudotumor cerebri and neurology and ophthalmology were consulted. Ophthalmology performed a dilated eye examination, which showed no papilledema and also obtained OCT testing which was normal. Ophthalmology recommendations included following outpatient with neuro-ophthalmology. Patient was seen by neurology. Neurology found the patient's current presentation to be suggestive of Migraine headache, which was markedly improved in symptomatic burden with abortive treatment with IV Decadron + Benadryl + Reglan and  assessed that based on the current clinical examination, there was no evidence to suggest elevated Intracranial Pressure, related to any etiology. Neurology recommended that in the presence of normal/reassuring ophthalmologic findings, the patient did not require any further inpatient neurologic work up and to have patient follow-up outpatient with headache clinic.    #Thrombocytosis  On CBC, patient was found to have platelets elevated to 1030. Hematology was consulted.Given her platelet count over 1 million, Hematology recommended ruling out an acquired von Willebrand disease and von Willebrand disease panel was ordered. Also concern for essential thrombocythemia and JAK2 cascade testing was ordered. Ultrasound of the spleen was performed. Ultrasound showed spleen is normal in size, echotexture and echogenicity, measuring 7.4 x 4 x 7.6 cm, for an estimated volume of 117 mL. Patient was instructed to follow-up outpatient with hematology for further workup/management and set up [facility] for follow up on pending lab results.     #Anemia  #Iron deficiency  On admission, patient's Hgb was 11.2, MCV 80.7, MCH 24.8, MCHC 30.8, RDW 15.5, iron panel: iron 30, ferritin 38, iron saturation 9, TIBC 328. Patient treated with 125 mg IV ferric gluconate.     #Pregnancy   Urine POCT BhCG was positive. Transvaginal ultrasound done that showed single live intrauterine pregnancy with approximate estimated gestational age of 9 weeks 2 days. Patient is taking a multivitamin for current pregnancy and was encouraged to continue outpatient follow-up with OB-GYN.         Exam on Day of Discharge  Physical Exam  Constitutional:       General: She is not in acute distress.     Appearance: She is not ill-appearing.   HENT:      Mouth/Throat:      Mouth: Mucous membranes are moist.   Eyes:      Pupils: Pupils are equal, round, and reactive to light.   Cardiovascular:      Rate and Rhythm: Regular rhythm.      Heart sounds: Normal heart sounds.  No murmur heard.  Pulmonary:      Effort: No respiratory distress.      Breath sounds: Normal breath sounds. No wheezing.   Abdominal:      General: There is no distension.      Palpations: Abdomen is soft.      Tenderness: There is no abdominal tenderness.   Musculoskeletal:      Cervical back: No rigidity or tenderness.      Right lower leg: No edema.      Left lower leg: No edema.   Skin:     General: Skin is warm and dry.      Findings: No rash.   Neurological:      Mental Status: She is oriented to person, place, and time.      Cranial Nerves: No cranial nerve deficit.      Sensory: No sensory deficit.      Motor: No weakness.      Coordination: Coordination normal.      Deep Tendon Reflexes: Reflexes normal.         Consults During Admission  IP CONSULT TO HEMATOLOGY  IP CONSULT TO NEUROLOGY  IP CONSULT TO OPHTHALMOLOGY    DISCHARGE MEDICATIONS   New, changed, or stopped medications from this admission:      Non-Medication orders at discharge:   {Discharge non-med orders (select AFTER ordering discharge orders):66653}              PROCEDURES / LABS / IMAGING      Operative Procedures  None    Other Procedures  {procedures:05232}    Pertinent Labs  {diagnostics:47517}    Pertinent Imaging  MRI BRAIN WITHOUT CONTRAST    Result Date: 6/11/2023  IMPRESSION: 1.  No acute infarct or acute hemorrhage. 2.  Fluid-filled and mildly tortuous optic nerve sheath complexes, partially empty sella and moderate focal stenoses of the transverse sinuses bilaterally just proximal to the transverse sigmoid junction.  Findings are consistent with intracranial hypertension / pseudotumor cerebri.  Correlation with CSF sampling / opening pressure recommended.     MRI ANGIOGRAM HEAD WITHOUT CONTRAST    Result Date: 6/11/2023  IMPRESSION: 1.  No acute infarct or acute hemorrhage. 2.  Fluid-filled and mildly tortuous optic nerve sheath complexes, partially empty sella and moderate focal stenoses of the transverse sinuses bilaterally just  proximal to the transverse sigmoid junction.  Findings are consistent with intracranial hypertension / pseudotumor cerebri.  Correlation with CSF sampling / opening pressure recommended.     ULTRASOUND ABDOMEN LIMITED    Result Date: 6/11/2023  IMPRESSION: Normal spleen COMMENT: Ultrasound of the spleen was performed.  Spleen is normal in size, echotexture and echogenicity.  Spleen measures 7.4 x 4 x 7.6 cm, for an estimated volume of 117 mL.     ULTRASOUND PREGNANCY < 14 WEEKS TRANSABDOMINAL LIMITED    Result Date: 6/11/2023  IMPRESSION:   Single live intrauterine pregnancy with approximate estimated gestational age of 9 weeks 2 days.. Follow-up with OB/GYN.     ULTRASOUND PREGNANCY TRANSVAGINAL ONLY    Result Date: 6/11/2023  IMPRESSION:   Single live intrauterine pregnancy with approximate estimated gestational age of 9 weeks 2 days.. Follow-up with OB/GYN.     CT HEAD WITHOUT IV CONTRAST    Result Date: 6/11/2023  IMPRESSION: 1. No evidence of hemorrhage, mass or acute infarct. 2. Extensive paranasal sinus disease. Please correlate for acute sinusitis. 3. Partially empty sella which is nonspecific and usually a congenital variant however can be seen with intracranial hypertension in the proper clinical setting. COMMENT: Technique: Computed tomography of the brain was performed utilizing contiguous 2.5 mm transaxial sections without intravenous contrast administration. CT DOSE:  One or more dose reduction techniques (e.g. automated exposure control, adjustment of the mA and/or kV according to patient size, use of iterative reconstruction technique) utilized for this examination. Comparison studies: None. Postsurgical change: None. Brain parenchyma: There is no intraparenchymal hemorrhage, mass effect, midline shift or extra-axial fluid collection. 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 White matter changes: Normal for age Ventricles, cisterns, and sulci: Normal in size and  configuration. Sella and cerebellar tonsils: There is a partially empty sella.. Calvarium and extra cranial soft tissues: Normal. Paranasal sinuses: Extensive opacification of the left frontal, ethmoid, maxillary sinuses with associated fluid levels.. Mastoid air cell: Normal Orbits: Normal       OUTPATIENT  FOLLOW-UP / REFERRALS / PENDING TESTS      Outpatient Follow-Up Appointments  Encounter Information    This patient does not currently have any appointments scheduled.         Referrals  No orders of the defined types were placed in this encounter.      Test Results Pending at Discharge  Unresulted Labs (From admission, onward)     Start     Ordered    06/11/23 0853  JAK2 V617F Cascading Refl to CALR, JAK2 Exon 12, MPL, and CSF3R Blood, Venous  STAT        Question Answer Comment   Source: Blood, Venous    Release to patient Immediate        06/11/23 0852    06/11/23 0853  Von Willebrand panel  Once        Question:  Release to patient  Answer:  Immediate    06/11/23 0852                Important Issues to Address in Follow-Up  Follow-up outpatient with OBGYN - Your urine BhCG was positive on admission and transvaginal ultrasound showed single live intrauterine pregnancy with approximate estimated gestational age of 9 weeks 2 days. Follow up outpatient with OBGYN for pregnancy management.   Follow-up outpatient with Neurology Headache Clinic - Your MRI brain w/o contrast and MRI angiogram head w/o contrast showed fluid-filled and mildly tortuous optic nerve sheath complexes, partially empty sella and moderate focal stenoses of the transverse sinuses bilaterally just proximal to the transverse sigmoid junction.  Findings are consistent with intracranial hypertension / pseudotumor cerebri. Clinically your presentation is consistent with a migraine headache. Follow-up outpatient with headache clinic.   Follow-up outpatient with Neuro-ophthalmology   Follow-up with Hematology -  You were found to have elevated  platelet count of over 1 million (1030 K/uL) please follow up with hematology.   DISCHARGE DISPOSITION      Disposition:  Home    Code Status At Discharge: Full Code    Physician Order for Life-Sustaining Treatment Document Status      No documents found                 ATTENDING DOCUMENTATION  ALSO SEE ATTENDING ATTESTATION SECTION OF NOTE

## 2023-06-12 NOTE — CONSULTS
Ophthalmology Consultation       CHIEF COMPLAINT   Chief Complaint   Patient presents with    Headache        HISTORY OF PRESENT ILLNESS      This is a 27 y.o. female with a past medical history of miscarriage (nov 2022), migraine HA who presented to [facility] with a persistent HA that extended behind her left ear and occiput. It was been waking her up at night. She is 9 weeks pregnant. She notes HA during her first pregnancy however none that went to the back of her head. She denies blurry vision, transient visual obscurations, positional HA, pulsatile tinnitus. She notes some mild tinnitus during her first pregnancy but none since. Neurology recommended Ophthalmology evaluation due to suspicious MRI findings and wanting DFE and OCT testing for papilledema.      MRI showed dilated tortuous optic nerve sheaths with no flattening of the posterior globe. Also mild stenosis of superior sagittal sinus.    Denies previous ocular history or family history of ocular disease.  PAST MEDICAL AND SURGICAL HISTORY      POHx:  Melissa  History reviewed. No pertinent past medical history.  History reviewed. No pertinent surgical history.    MEDICATIONS        Current Facility-Administered Medications:     acetaminophen (TYLENOL) tablet 650 mg, 650 mg, oral, q6h PRN, Ash Trammell DO, 650 mg at 06/12/23 1051    glucose chewable tablet 16-32 g of dextrose, 16-32 g of dextrose, oral, PRN **OR** dextrose 40 % oral gel 15-30 g of dextrose, 15-30 g of dextrose, oral, PRN **OR** glucagon (GLUCAGEN) injection 1 mg, 1 mg, intramuscular, PRN **OR** dextrose 50 % in water (D50) injection 12.5 g, 25 mL, intravenous, PRN, Ash Trammell DO    ferric gluconate (FERRLECIT) 125 mg in sodium chloride 0.9 % 100 mL IVPB, 125 mg, intravenous, Once, Davon Smith MD    heparin (porcine) 5,000 unit/mL injection 5,000 Units, 5,000 Units, subcutaneous, q8h HO, Ash Trammell DO, 5,000 Units at 06/12/23 1440    [Provider Managed Hold]  metoclopramide (REGLAN) injection 5 mg, 5 mg, intravenous, q6h CHARLIN, Ash Trammell DO  No current outpatient medications on file.  ALLERGIES      Patient has no known allergies.  FAMILY HISTORY      History reviewed. No pertinent family history.  SOCIAL HISTORY      Social History     Tobacco Use    Smoking status: Never    Smokeless tobacco: Never   Substance Use Topics    Alcohol use: Never     REVIEW OF SYSTEMS      12 pt ROS negative unless listed in HPI  OPHTHALMIC EXAMINATION      Vital signs over last 24 hours  Temp:  [36.7 °C (98.1 °F)] 36.7 °C (98.1 °F)  Heart Rate:  [] 79  Resp:  [20-22] 20  BP: ()/(52-63) 104/58   Right Eye Left Eye   Visual Acuity sc PH 20/20 PH 20/20   Pupils Round and Reactive, No RAPD Round and Reactive, No RAPD   Intraocular pressure Tonopen 12 mmHg  13 mmHg   Color 8/8 fast 8/8 fast       Extraocular muscles (EOM): Orthophoric            100     100                               100    100             \     /                                          \    /                     100 -  OD - 100                        100 -  OS - 100              /    \                                          /     \                 100   100                                100    100    Visual fields: Full by confrontation testing OU    Anterior Slit Lamp Exam:     Right Eye Left Eye   Orbits/lids/adnexa WNL without mass, proptosis, ptosis, ecchymosis, or edema  WNL without mass, proptosis, ptosis, ecchymosis, or edema   Sclera/conjunctiva White and Quiet White and Quiet   Cornea Clear, without epithelial defects, staining, opacity, or infiltrate; (-) Yue  Clear, without epithelial defects, staining, opacity, or infiltrate; (-) Yue    Anterior chamber Well formed, no hypopyon or hyphema Well formed, no hypopyon or hyphema   Iris No rubeosis or defect No rubeosis or defect   Lens clear clear       Dilated Fundus Exam  1gtt Phenylephrine/Tropicamide OU @ 4:06 PM     Right Eye Left  Eye   Vitreous No cell. Medium clear without heme, cells, or opacity No cell. Medium clear without heme, cells, or opacity   Nerve +SVP. No vessel obscuration or heme. Sharp disc margins. Healthy, pink without heme, edema, pallor, or exudate +SVP. No vessel obscuration or heme. Sharp disc margins. Healthy, pink without heme, edema, pallor, or exudate   Macula Flat with normal contour  Flat with normal contour   Vessels No emboli or sheathing No emboli and sheathing   Periphery Intact 360, no RH/RT/RD Intact 360, no RH/RT/RD     LABS / IMAGING / STUDIES      Labs  Labs reviewed in EMR  Imaging  MRI BRAIN WITHOUT CONTRAST    Result Date: 6/11/2023  IMPRESSION: 1.  No acute infarct or acute hemorrhage. 2.  Fluid-filled and mildly tortuous optic nerve sheath complexes, partially empty sella and moderate focal stenoses of the transverse sinuses bilaterally just proximal to the transverse sigmoid junction.  Findings are consistent with intracranial hypertension / pseudotumor cerebri.  Correlation with CSF sampling / opening pressure recommended.     MRI ANGIOGRAM HEAD WITHOUT CONTRAST    Result Date: 6/11/2023  IMPRESSION: 1.  No acute infarct or acute hemorrhage. 2.  Fluid-filled and mildly tortuous optic nerve sheath complexes, partially empty sella and moderate focal stenoses of the transverse sinuses bilaterally just proximal to the transverse sigmoid junction.  Findings are consistent with intracranial hypertension / pseudotumor cerebri.  Correlation with CSF sampling / opening pressure recommended.     ULTRASOUND ABDOMEN LIMITED    Result Date: 6/11/2023  IMPRESSION: Normal spleen COMMENT: Ultrasound of the spleen was performed.  Spleen is normal in size, echotexture and echogenicity.  Spleen measures 7.4 x 4 x 7.6 cm, for an estimated volume of 117 mL.     ULTRASOUND PREGNANCY < 14 WEEKS TRANSABDOMINAL LIMITED    Result Date: 6/11/2023  IMPRESSION:   Single live intrauterine pregnancy with approximate estimated  gestational age of 9 weeks 2 days.. Follow-up with OB/GYN.     ULTRASOUND PREGNANCY TRANSVAGINAL ONLY    Result Date: 6/11/2023  IMPRESSION:   Single live intrauterine pregnancy with approximate estimated gestational age of 9 weeks 2 days.. Follow-up with OB/GYN.     CT HEAD WITHOUT IV CONTRAST    Result Date: 6/11/2023  IMPRESSION: 1. No evidence of hemorrhage, mass or acute infarct. 2. Extensive paranasal sinus disease. Please correlate for acute sinusitis. 3. Partially empty sella which is nonspecific and usually a congenital variant however can be seen with intracranial hypertension in the proper clinical setting. COMMENT: Technique: Computed tomography of the brain was performed utilizing contiguous 2.5 mm transaxial sections without intravenous contrast administration. CT DOSE:  One or more dose reduction techniques (e.g. automated exposure control, adjustment of the mA and/or kV according to patient size, use of iterative reconstruction technique) utilized for this examination. Comparison studies: None. Postsurgical change: None. Brain parenchyma: There is no intraparenchymal hemorrhage, mass effect, midline shift or extra-axial fluid collection. 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 White matter changes: Normal for age Ventricles, cisterns, and sulci: Normal in size and configuration. Sella and cerebellar tonsils: There is a partially empty sella.. Calvarium and extra cranial soft tissues: Normal. Paranasal sinuses: Extensive opacification of the left frontal, ethmoid, maxillary sinuses with associated fluid levels.. Mastoid air cell: Normal Orbits: Normal        ASSESSMENT AND PLAN     #Pseudotumor Cerebri  - Patient admitted with migraine headache  - Please check anticardiolipin antibody due to previous miscarriage.  - follow Heme for marked thrombocytosis.  - MRI concerning for IIH due to dilated and tortuous optic nerve sheaths with no flattening of the posterior globe. MRV  without contrast shows moderate focal stenoses of transverse sinuses bilaterally just proximal to the transverse sigmoid junction.  - Neurology wanted ophthalmology to perform OCT and DFE to r/o papilledema  - Patient denies pulsatile tinnitus, transient visual obscuration, or positional changes with headache.  - Vision intact 20/20 OU with pinhole and color vision intact 8/8 OU. DFE shows sharp disc margins, no RNFL heme, and no vessel obscuration concerning for papilededma, however OCT of the RNFL does show mild elevations OD:  OD, , global 128. OS: , , global 121.  - Spontaneous venous pulsations noted on exam therefore ICP must be equal to or less than 180 mmHg.   - No need for LP at this time  - No need for medical therapy at this time as patient is asymptomatic and 9 weeks pregnant  - Patient will require outpatient follow up during pregnancy at a minimum of every trimester as she may become more symptomatic with further weight gain in pregnancy. We like to hold off on medical therapy unless the patient is symptomatic or the vision is threatened.    The patient was counseled and confirmed understanding.  The patient will follow up with their primary ophthalmologist in 1-2  Weeks    Should Try to follow up with Kerry Alexandra MD or Lele Bustillo MD at Neuro[facility]Ophth at [facility] in 2-3 weeks.   Phone: [phone].        Case discussed with Dr. Singh, Neuro-Ophth, will need to arrange follow up with Neuro-ophth in [place].    The above case was discussed with Dr. Phan who agrees with assessment and plan above.    Thank you for this consultation.  Please call with additional questions or further concerns.

## 2023-06-12 NOTE — DISCHARGE SUMMARY
Internal Medicine  Inpatient Discharge Summary        BRIEF OVERVIEW   Admitting Provider: Ching Khanna MD  Attending Provider: Diana Power MD Attending phys phone: [phone]    PCP: Matt Alvarez, No Pcp [phone]    Admission Date: 2023  Discharge Date: 2023     DISCHARGE DIAGNOSES      Primary Discharge Diagnosis  Thrombocytosis    Secondary Discharge Diagnoses  Active Hospital Problems    Diagnosis Date Noted    Thrombocytosis 2023    Headache 2023    Pregnancy 2023      Resolved Hospital Problems   No resolved problems to display.       Active Problem List on Day of Discharge  Patient Active Problem List   Diagnosis    Thrombocytosis    Headache    Pregnancy     SUMMARY OF HOSPITALIZATION      Presenting Problem/History of Present Illness  This is a 27 y.o. year-old female admitted on 2023 with Thrombocytosis [D75.839].    Hospital Course  27 year old female with no significant medical history,  who is currently 9 weeks pregnant presented to the ED on  for worsening left sided headache pain for the past 5 days. Patient reported a constant 10/10 sharp, throbbing headache pain that originated in the left occipital region and moved to the left frontoparietal scalp with associated symptoms of photophobia and blurry vision. Denied any other associated symptoms such as changes in hearing, changes in speech,  changes in gait or balance, changes in bowel/bladder control, nausea/vomiting,dizziness, lightheadedness, weakness, paraesthesias, exacerbating or alleviating positional changes, visual disturbances such as scotomas, flashers, floaters, double vision.  Patient tried Tylenol but only had mild symptomatic relief, prompting her to come to the ED.      In the ED, patient was HD stable, with temp of 97.9, HR 77, RR 19, /83 SpO2 98%. Labs were remarkable for Hgb 11.2, platelets 1030, MCV 80.7, RDW 15.5, iron 30, iron saturation 9, PTT 37  and was treated with dexamethasone, benadryl, reglan, and 1L IVF. CT head w/o contrast, MRI brain w/o contrast and MRI angiogram head w/o contrast were ordered and consults to neurology and hematology were placed. Patient was admitted to medicine for further workup and management.      Hospital Course    #Headache   Patient presented with 5 days of gradually worsening left-sided posterior headache radiating to the frontal-parietal region with associated with photophobia and mild blurry vision. In the ED, patient's vitals were stable. Labs were remarkable for Hgb 11.2, platelets 1030, MCV 80.7, RDW 15.5, iron 30, iron saturation 9, PTT 37 and was treated with dexamethasone, benadryl, reglan, and 1L IVF. Imaging done during patient's hospital course included: CT head w/o IV contrast that showed no evidence of hemorrhage, mass or acute infarct, Extensive paranasal sinus disease, and partially empty sella, normal ventricles, cisterns, and sulci. an abdominal ultrasound that showed MRI Brain w/o contrast and MRI Angiogram head w/o contrast  showed No acute infarct or acute hemorrhage. Fluid-filled and mildly tortuous optic nerve sheath complexes, partially empty sella and moderate focal stenoses of the transverse sinuses bilaterally just proximal to the transverse sigmoid junction. Findings consistent with intracranial hypertension / pseudotumor cerebri.      Imaging was concerning for pseudotumor cerebri and neurology and ophthalmology were consulted. Ophthalmology performed a dilated eye examination, which showed no papilledema and also obtained OCT testing which was normal. Ophthalmology recommendations included following outpatient with neuro-ophthalmology. Patient was seen by neurology. Neurology found the patient's current presentation to be suggestive of Migraine headache, which was markedly improved in symptomatic burden with abortive treatment with IV Decadron + Benadryl + Reglan and assessed that based on the  current clinical examination, there was no evidence to suggest elevated Intracranial Pressure, related to any etiology. Neurology recommended that in the presence of normal/reassuring ophthalmologic findings, the patient did not require any further inpatient neurologic work up and to have patient follow-up outpatient with headache clinic.     #Thrombocytosis  On CBC, patient was found to have platelets elevated to 1030. Hematology was consulted.Given her platelet count over 1 million, Hematology recommended ruling out an acquired von Willebrand disease and von Willebrand disease panel was ordered. Also concern for essential thrombocythemia and JAK2 cascade testing was ordered. Ultrasound of the spleen was performed. Ultrasound showed spleen is normal in size, echotexture and echogenicity, measuring 7.4 x 4 x 7.6 cm, for an estimated volume of 117 mL. Patient was instructed to follow-up outpatient with hematology for further workup/management and set up [facility] for follow up on pending lab results.      #Anemia  #Iron deficiency  On admission, patient's Hgb was 11.2, MCV 80.7, MCH 24.8, MCHC 30.8, RDW 15.5, iron panel: iron 30, ferritin 38, iron saturation 9, TIBC 328. Patient treated with 125 mg IV ferric gluconate.      #Pregnancy   Urine POCT BhCG was positive. Transvaginal ultrasound done that showed single live intrauterine pregnancy with approximate estimated gestational age of 9 weeks 2 days. Patient is taking a multivitamin for current pregnancy and was encouraged to continue outpatient follow-up with OB-GYN.     Exam on Day of Discharge  Physical Exam   General: No acute distress. Resting comfortably in bed, VSS  HEENT: Neck supple, no JVD.   Eyes: No scleral icterus. Pupils equal, round, reactive to light.  Cardiac: RRR, no murmurs, rubs or gallops.  Pulm/Chest: Clear to ausculation bilaterally.  Abdomen: Soft, non-tender, non-distended. +BS. No rebound or guarding.  MSK: No joint deformity.  Extremities:  warm, well-perfused. No peripheral edema.   Neuro: A&O x4. Moving all extremities spontaneously, no focal deficits.   Psych: Alert and oriented, pleasant.   Skin: No rashes    Consults During Admission  IP CONSULT TO HEMATOLOGY  IP CONSULT TO NEUROLOGY  IP CONSULT TO OPHTHALMOLOGY    DISCHARGE MEDICATIONS   New, changed, or stopped medications from this admission:      Non-Medication orders at discharge:   Instructions for after discharge     Call provider for:  difficulty breathing, headache or visual disturbances      Call provider for:  extreme fatigue      Call provider for:  persistent dizziness or light-headedness      Call provider for:  persistent nausea or vomiting      Call provider for:  redness, tenderness, or signs of infection (pain, swelling, redness, odor or green/yellow discharge around incision site)      Call provider for:  severe uncontrolled pain      Call provider for:  temperature >100.4      Discharge diet      Diet Type / Texture: Regular    Follow-up with Department:      URGENT REFERRAL    Please refer to [facility]  [phone]    Follow-up with primary physician (PCP)      Other Follow-up      Please follow up with neuro-ophthalmology in 1 week after discharge  [facility] up [facility] 1 week after discharge  Please follow up with neurology 1 week after discharge    Post-Discharge Activity: Normal activity as tolerated.      Normal activity as tolerated.                    PROCEDURES / LABS / IMAGING      Operative Procedures  n/a    Other Procedures  n/a    Pertinent Labs  labs: Plt on d/c was 1076    Pertinent Imaging  MRI BRAIN WITHOUT CONTRAST    Result Date: 6/11/2023  IMPRESSION: 1.  No acute infarct or acute hemorrhage. 2.  Fluid-filled and mildly tortuous optic nerve sheath complexes, partially empty sella and moderate focal stenoses of the transverse sinuses bilaterally just proximal to the transverse  sigmoid junction.  Findings are consistent with intracranial hypertension / pseudotumor cerebri.  Correlation with CSF sampling / opening pressure recommended.     MRI ANGIOGRAM HEAD WITHOUT CONTRAST    Result Date: 6/11/2023  IMPRESSION: 1.  No acute infarct or acute hemorrhage. 2.  Fluid-filled and mildly tortuous optic nerve sheath complexes, partially empty sella and moderate focal stenoses of the transverse sinuses bilaterally just proximal to the transverse sigmoid junction.  Findings are consistent with intracranial hypertension / pseudotumor cerebri.  Correlation with CSF sampling / opening pressure recommended.     ULTRASOUND ABDOMEN LIMITED    Result Date: 6/11/2023  IMPRESSION: Normal spleen COMMENT: Ultrasound of the spleen was performed.  Spleen is normal in size, echotexture and echogenicity.  Spleen measures 7.4 x 4 x 7.6 cm, for an estimated volume of 117 mL.     ULTRASOUND PREGNANCY < 14 WEEKS TRANSABDOMINAL LIMITED    Result Date: 6/11/2023  IMPRESSION:   Single live intrauterine pregnancy with approximate estimated gestational age of 9 weeks 2 days.. Follow-up with OB/GYN.     ULTRASOUND PREGNANCY TRANSVAGINAL ONLY    Result Date: 6/11/2023  IMPRESSION:   Single live intrauterine pregnancy with approximate estimated gestational age of 9 weeks 2 days.. Follow-up with OB/GYN.     CT HEAD WITHOUT IV CONTRAST    Result Date: 6/11/2023  IMPRESSION: 1. No evidence of hemorrhage, mass or acute infarct. 2. Extensive paranasal sinus disease. Please correlate for acute sinusitis. 3. Partially empty sella which is nonspecific and usually a congenital variant however can be seen with intracranial hypertension in the proper clinical setting. COMMENT: Technique: Computed tomography of the brain was performed utilizing contiguous 2.5 mm transaxial sections without intravenous contrast administration. CT DOSE:  One or more dose reduction techniques (e.g. automated exposure control, adjustment of the mA and/or kV  according to patient size, use of iterative reconstruction technique) utilized for this examination. Comparison studies: None. Postsurgical change: None. Brain parenchyma: There is no intraparenchymal hemorrhage, mass effect, midline shift or extra-axial fluid collection. 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 White matter changes: Normal for age Ventricles, cisterns, and sulci: Normal in size and configuration. Sella and cerebellar tonsils: There is a partially empty sella.. Calvarium and extra cranial soft tissues: Normal. Paranasal sinuses: Extensive opacification of the left frontal, ethmoid, maxillary sinuses with associated fluid levels.. Mastoid air cell: Normal Orbits: Normal       OUTPATIENT  FOLLOW-UP / REFERRALS / PENDING TESTS      Outpatient Follow-Up Appointments  Encounter Information    This patient does not currently have any appointments scheduled.         Referrals  No orders of the defined types were placed in this encounter.      Test Results Pending at Discharge  Unresulted Labs (From admission, onward)     Start     Ordered    06/12/23 1338  Cardiolipin antibody  Once        Question:  Release to patient  Answer:  Immediate    06/12/23 1338    06/11/23 0853  JAK2 V617F Cascading Refl to CALR, JAK2 Exon 12, MPL, and CSF3R Blood, Venous  STAT        Question Answer Comment   Source: Blood, Venous    Release to patient Immediate        06/11/23 0852    06/11/23 0853  Von Willebrand panel  Once        Question:  Release to patient  Answer:  Immediate    06/11/23 0852                Important Issues to Address in Follow-Up  1. Please follow-up with your PCP in 1 week. Please ask them for a referral to [facility].  2. Please follow-up with hematology regarding your elevated platelet count (thrombocytosis). You [facility] [phone] once a referral has been made from your PCP or OB. GYN.   3. Please  follow-up with Neuro-ophthalmology  4. Please follow-up with a headache clinic.You can inquire about this from your PCP in order to receive a referral  5. We recommend repeating lab work (CBC) in 1 week. At the time of your discharge the following lab work had not resulted: JAK2 mutation and Von willebrand disease testing. If any of your doctors have access to EPIC (electronic medical record) will be able to see this labs. Furthermore, you will be able to see your lab work on MyChart. You can continue to follow-up with hematology he may need to repeat these tests.  DISCHARGE DISPOSITION      Disposition: Home     Code Status At Discharge: Full Code    Physician Order for Life-Sustaining Treatment Document Status      No documents found                 ATTENDING DOCUMENTATION  ALSO SEE ATTENDING ATTESTATION SECTION OF NOTE

## 2023-06-15 LAB
CARDIOLIPIN IGA SER IA-ACNC: <9.4 APL U/ML
CARDIOLIPIN IGG SER IA-ACNC: <9.4 GPL U/ML
CARDIOLIPIN IGM SER IA-ACNC: <9.4 MPL U/ML

## 2023-06-19 LAB
APTT PPP: 27 SEC (ref 23–32)
COAG FACT INTRINSIC PPP-IMP: ABNORMAL
FACT VIII ACT/NOR PPP: 107 % NORMAL (ref 50–180)
VWF AG ACT/NOR PPP IA: 92 % (ref 50–217)
VWF MULTIMERS PPP QL: ABNORMAL
VWF:RCO ACT/NOR PPP PL AGG: 24 % NORMAL (ref 42–200)

## 2023-06-20 LAB
CALR EXON 9 MUT ANL BLD/T: NOT DETECTED
CLINICAL INFO: NORMAL
CSF3R GENE EXON 14+17 MUT ANL BLD/T: NOT DETECTED
JAK2 EXON 12 MUT ANL BLD/T: NOT DETECTED
JAK2 P.V617F BLD/T QL: NOT DETECTED
MPL P.W515L+W515K+S505N BLD/T QL: NOT DETECTED
NARRATIVE DIAGNOSTIC REPORT-IMP: NORMAL
QUEST BLOCK/SPECIMEN ID: NORMAL
SERVICE CMNT 05-IMP: NORMAL
SPECIMEN SOURCE: NORMAL

## 2023-06-22 ENCOUNTER — DOCUMENTATION (OUTPATIENT)
Dept: HEMATOLOGY/ONCOLOGY | Facility: HOSPITAL | Age: 28
End: 2023-06-22

## 2023-06-22 NOTE — PROGRESS NOTES
Hematology Oncology  Daily Progress Note                 BRIEF ONCOLOGY NOTE   I was able to speak to Mrs. Up and her . I updated them regarding the results of JAK2 testing and vWD panel. She is scheduled to see a Hematologist next week.    Please call with additional questions.     Neil Vega MD  Hematology/Oncology Fellow  [id number]

## 2023-10-23 ENCOUNTER — APPOINTMENT (OUTPATIENT)
Dept: ULTRASOUND IMAGING | Age: 28
End: 2023-10-23
Payer: MEDICAID

## 2023-10-23 ENCOUNTER — HOSPITAL ENCOUNTER (EMERGENCY)
Age: 28
Discharge: HOME | End: 2023-10-23
Attending: EMERGENCY MEDICINE
Payer: MEDICAID

## 2023-10-23 ENCOUNTER — HOSPITAL ENCOUNTER (OUTPATIENT)
Age: 28
Discharge: ANOTHER HOSPITAL | End: 2023-10-23
Attending: OBSTETRICS & GYNECOLOGY | Admitting: OBSTETRICS & GYNECOLOGY
Payer: MEDICAID

## 2023-10-23 VITALS
WEIGHT: 187.17 LBS | DIASTOLIC BLOOD PRESSURE: 75 MMHG | HEART RATE: 90 BPM | HEIGHT: 70 IN | OXYGEN SATURATION: 100 % | RESPIRATION RATE: 21 BRPM | SYSTOLIC BLOOD PRESSURE: 116 MMHG | BODY MASS INDEX: 26.8 KG/M2 | TEMPERATURE: 98.4 F

## 2023-10-23 VITALS
DIASTOLIC BLOOD PRESSURE: 86 MMHG | OXYGEN SATURATION: 98 % | TEMPERATURE: 98.2 F | SYSTOLIC BLOOD PRESSURE: 120 MMHG | HEART RATE: 102 BPM

## 2023-10-23 DIAGNOSIS — R07.9 CHEST PAIN DURING PREGNANCY: Primary | ICD-10-CM

## 2023-10-23 DIAGNOSIS — O99.891 CHEST PAIN DURING PREGNANCY: Primary | ICD-10-CM

## 2023-10-23 DIAGNOSIS — K21.9 GASTROESOPHAGEAL REFLUX DISEASE, UNSPECIFIED WHETHER ESOPHAGITIS PRESENT: ICD-10-CM

## 2023-10-23 LAB
ABO + RH BLD: NORMAL
ALBUMIN SERPL-MCNC: 3.7 G/DL (ref 3.4–5)
ALBUMIN/GLOB SERPL: 1.2 {RATIO} (ref 1.1–2.2)
ALP SERPL-CCNC: 76 U/L (ref 40–129)
ALT SERPL-CCNC: 14 U/L (ref 10–40)
ANION GAP SERPL CALCULATED.3IONS-SCNC: 10 MMOL/L (ref 3–16)
APTT BLD: 28.5 SEC (ref 22.7–35.9)
AST SERPL-CCNC: 18 U/L (ref 15–37)
BASOPHILS # BLD: 0 K/UL (ref 0–0.2)
BASOPHILS NFR BLD: 0.3 %
BILIRUB SERPL-MCNC: 0.3 MG/DL (ref 0–1)
BILIRUB UR QL STRIP.AUTO: NEGATIVE
BLD GP AB SCN SERPL QL: NORMAL
BUN SERPL-MCNC: 4 MG/DL (ref 7–20)
CALCIUM SERPL-MCNC: 9 MG/DL (ref 8.3–10.6)
CHLORIDE SERPL-SCNC: 103 MMOL/L (ref 99–110)
CLARITY UR: CLEAR
CO2 SERPL-SCNC: 24 MMOL/L (ref 21–32)
COLOR UR: YELLOW
CREAT SERPL-MCNC: <0.5 MG/DL (ref 0.6–1.1)
CREAT UR-MCNC: 37.9 MG/DL (ref 28–259)
DEPRECATED RDW RBC AUTO: 13.8 % (ref 12.4–15.4)
EKG ATRIAL RATE: 93 BPM
EKG DIAGNOSIS: NORMAL
EKG P AXIS: 35 DEGREES
EKG P-R INTERVAL: 152 MS
EKG Q-T INTERVAL: 374 MS
EKG QRS DURATION: 90 MS
EKG QTC CALCULATION (BAZETT): 465 MS
EKG R AXIS: -43 DEGREES
EKG T AXIS: 20 DEGREES
EKG VENTRICULAR RATE: 93 BPM
EOSINOPHIL # BLD: 0.1 K/UL (ref 0–0.6)
EOSINOPHIL NFR BLD: 1.2 %
FIBRINOGEN PPP-MCNC: 530 MG/DL (ref 243–550)
GFR SERPLBLD CREATININE-BSD FMLA CKD-EPI: >60 ML/MIN/{1.73_M2}
GLUCOSE SERPL-MCNC: 83 MG/DL (ref 70–99)
GLUCOSE UR STRIP.AUTO-MCNC: NEGATIVE MG/DL
HCT VFR BLD AUTO: 34.5 % (ref 36–48)
HGB BLD-MCNC: 11.3 G/DL (ref 12–16)
HGB UR QL STRIP.AUTO: NEGATIVE
INR PPP: 0.94 (ref 0.84–1.16)
KETONES UR STRIP.AUTO-MCNC: NEGATIVE MG/DL
LEUKOCYTE ESTERASE UR QL STRIP.AUTO: NEGATIVE
LIPASE SERPL-CCNC: 24 U/L (ref 13–60)
LYMPHOCYTES # BLD: 1.3 K/UL (ref 1–5.1)
LYMPHOCYTES NFR BLD: 19.8 %
MCH RBC QN AUTO: 26.4 PG (ref 26–34)
MCHC RBC AUTO-ENTMCNC: 32.7 G/DL (ref 31–36)
MCV RBC AUTO: 80.6 FL (ref 80–100)
MONOCYTES # BLD: 0.9 K/UL (ref 0–1.3)
MONOCYTES NFR BLD: 13.4 %
NEUTROPHILS # BLD: 4.4 K/UL (ref 1.7–7.7)
NEUTROPHILS NFR BLD: 65.3 %
NITRITE UR QL STRIP.AUTO: NEGATIVE
PH UR STRIP.AUTO: 7 [PH] (ref 5–8)
PLATELET # BLD AUTO: 192 K/UL (ref 135–450)
PMV BLD AUTO: 10.2 FL (ref 5–10.5)
POTASSIUM SERPL-SCNC: 4 MMOL/L (ref 3.5–5.1)
PROT SERPL-MCNC: 6.8 G/DL (ref 6.4–8.2)
PROT UR STRIP.AUTO-MCNC: NEGATIVE MG/DL
PROT UR-MCNC: 5 MG/DL
PROT/CREAT UR-RTO: 0.1 MG/DL
PROTHROMBIN TIME: 12.5 SEC (ref 11.5–14.8)
RBC # BLD AUTO: 4.28 M/UL (ref 4–5.2)
SARS-COV-2 RDRP RESP QL NAA+PROBE: NOT DETECTED
SODIUM SERPL-SCNC: 137 MMOL/L (ref 136–145)
SP GR UR STRIP.AUTO: 1.01 (ref 1–1.03)
TROPONIN, HIGH SENSITIVITY: <6 NG/L (ref 0–14)
TROPONIN, HIGH SENSITIVITY: <6 NG/L (ref 0–14)
UA DIPSTICK W REFLEX MICRO PNL UR: NORMAL
URN SPEC COLLECT METH UR: NORMAL
UROBILINOGEN UR STRIP-ACNC: 1 E.U./DL
WBC # BLD AUTO: 6.8 K/UL (ref 4–11)

## 2023-10-23 PROCEDURE — 80053 COMPREHEN METABOLIC PANEL: CPT

## 2023-10-23 PROCEDURE — 76705 ECHO EXAM OF ABDOMEN: CPT

## 2023-10-23 PROCEDURE — 93010 ELECTROCARDIOGRAM REPORT: CPT | Performed by: INTERNAL MEDICINE

## 2023-10-23 PROCEDURE — 81003 URINALYSIS AUTO W/O SCOPE: CPT

## 2023-10-23 PROCEDURE — 99205 OFFICE O/P NEW HI 60 MIN: CPT

## 2023-10-23 PROCEDURE — 86850 RBC ANTIBODY SCREEN: CPT

## 2023-10-23 PROCEDURE — 99284 EMERGENCY DEPT VISIT MOD MDM: CPT

## 2023-10-23 PROCEDURE — 93005 ELECTROCARDIOGRAM TRACING: CPT | Performed by: EMERGENCY MEDICINE

## 2023-10-23 PROCEDURE — 85025 COMPLETE CBC W/AUTO DIFF WBC: CPT

## 2023-10-23 PROCEDURE — 86900 BLOOD TYPING SEROLOGIC ABO: CPT

## 2023-10-23 PROCEDURE — 87635 SARS-COV-2 COVID-19 AMP PRB: CPT

## 2023-10-23 PROCEDURE — 86901 BLOOD TYPING SEROLOGIC RH(D): CPT

## 2023-10-23 PROCEDURE — 82570 ASSAY OF URINE CREATININE: CPT

## 2023-10-23 PROCEDURE — 84484 ASSAY OF TROPONIN QUANT: CPT

## 2023-10-23 PROCEDURE — 84156 ASSAY OF PROTEIN URINE: CPT

## 2023-10-23 PROCEDURE — 85610 PROTHROMBIN TIME: CPT

## 2023-10-23 PROCEDURE — 85384 FIBRINOGEN ACTIVITY: CPT

## 2023-10-23 PROCEDURE — 59025 FETAL NON-STRESS TEST: CPT

## 2023-10-23 PROCEDURE — 84550 ASSAY OF BLOOD/URIC ACID: CPT

## 2023-10-23 PROCEDURE — 85730 THROMBOPLASTIN TIME PARTIAL: CPT

## 2023-10-23 PROCEDURE — 83690 ASSAY OF LIPASE: CPT

## 2023-10-23 RX ORDER — FAMOTIDINE 20 MG/1
20 TABLET, FILM COATED ORAL 2 TIMES DAILY
Qty: 60 TABLET | Refills: 0 | Status: SHIPPED | OUTPATIENT
Start: 2023-10-23

## 2023-10-23 ASSESSMENT — PATIENT HEALTH QUESTIONNAIRE - PHQ9
2. FEELING DOWN, DEPRESSED OR HOPELESS: 0
SUM OF ALL RESPONSES TO PHQ QUESTIONS 1-9: 0
SUM OF ALL RESPONSES TO PHQ9 QUESTIONS 1 & 2: 0
SUM OF ALL RESPONSES TO PHQ QUESTIONS 1-9: 0
SUM OF ALL RESPONSES TO PHQ QUESTIONS 1-9: 0
1. LITTLE INTEREST OR PLEASURE IN DOING THINGS: 0
SUM OF ALL RESPONSES TO PHQ QUESTIONS 1-9: 0

## 2023-10-23 ASSESSMENT — PAIN - FUNCTIONAL ASSESSMENT
PAIN_FUNCTIONAL_ASSESSMENT: 0-10
PAIN_FUNCTIONAL_ASSESSMENT: ACTIVITIES ARE NOT PREVENTED

## 2023-10-23 ASSESSMENT — PAIN SCALES - GENERAL
PAINLEVEL_OUTOF10: 3
PAINLEVEL_OUTOF10: 3

## 2023-10-23 ASSESSMENT — PAIN DESCRIPTION - ONSET: ONSET: SUDDEN

## 2023-10-23 ASSESSMENT — PAIN DESCRIPTION - PAIN TYPE: TYPE: ACUTE PAIN

## 2023-10-23 ASSESSMENT — LIFESTYLE VARIABLES
HOW OFTEN DO YOU HAVE A DRINK CONTAINING ALCOHOL: NEVER
HOW MANY STANDARD DRINKS CONTAINING ALCOHOL DO YOU HAVE ON A TYPICAL DAY: PATIENT DOES NOT DRINK

## 2023-10-23 ASSESSMENT — PAIN DESCRIPTION - LOCATION: LOCATION: CHEST

## 2023-10-23 ASSESSMENT — PAIN DESCRIPTION - FREQUENCY: FREQUENCY: INTERMITTENT

## 2023-10-23 ASSESSMENT — PAIN DESCRIPTION - DESCRIPTORS: DESCRIPTORS: DISCOMFORT

## 2023-10-23 ASSESSMENT — PAIN DESCRIPTION - ORIENTATION: ORIENTATION: MID

## 2023-10-23 NOTE — FLOWSHEET NOTE
Called back to ED Charge RN, Stephen Coyne to inform her that patient is complaining of chest pain and states that was reported to ED staff. Informed her that the baby looks good on the monitor, that we are sending preeclampsia labs r/t chest pain and asked if they would like us to draw and cardiac labs prior to us sending her back to ED for chest pain assessment. She request CBC, CMP, and troponin, orders obtained from Dr. Perlita Flower. Stephen Coyne requested that we take patients directly to room 7 when we return her to ED. Aramis Lee RN triage nurse aware.

## 2023-10-23 NOTE — ED TRIAGE NOTES
Patient arrived to the ED via wheelchair from home w/ complaints of chest pain.      Patient/EMS reports states Onset around 2100 was walking; pain was mid sternal and upper back pain; currently 28wk and 4 days pregnant, was cleared by OB for eval; they sent down cardiac labs; EKG was preformed upon arrival; pain is intermitted now mid sternal   had covid but patient took home test and was negative     Patient A&O x 4, VSS,

## 2023-10-23 NOTE — FLOWSHEET NOTE
Assessment questions being asked, pt stated her  had Kesha Tomas last week, she took a test yesterday with negative result. Mask given to pt to wear. Pt reports intermittent sharp, tightening pain above umbilicus across ABD, chest pain sharp and throbbing. Pt reports coughing last night. Lungs clear. Nausea yesterday. Pain 3-4/10, stated she can talk through it.

## 2023-10-23 NOTE — DISCHARGE INSTRUCTIONS
Home Undelivered Discharge Instructions      Other instructions: Do kick counts once a day on your baby. Choose the time of day your baby is most active. Get in a comfortable lying or sitting position and time how long it takes to feel 10 kicks, twists, turns, swishes, or rolls. Call your physician or midwife if there have not been 10 kicks in 1 hours    Call physician or midwife, return to Labor and Delivery, call 911, or go to the nearest Emergency Room if: increased leakage or fluid, contractions more than  5 per  1 hour, decreased fetal movement, persistent low back pain or cramping, bleeding from vaginal area, difficulty urinating, pain with urination, difficulty breathing, new calf pain, persistent headache, vision change, or any other concerns.

## 2023-10-23 NOTE — FLOWSHEET NOTE
Dr. Thomas Reyez at bedside. Strip reviewed. Order to take off EFM at this time and to be sent to ER for chest pain work up once blood work collected and sent.

## 2023-10-23 NOTE — DISCHARGE INSTRUCTIONS
Avoid acidic foods, avoid eating before laying flat. Use over-the-counter antacids such as Tums. If these interventions are not controlling your symptoms, consider taking Pepcid. You can discuss this with your OB/GYN.

## 2023-10-23 NOTE — FLOWSHEET NOTE
Pt wheeled to ED room 7, Anna RN given report. Anna notified Dr. Mateusz Burns OB will reviewed [facility] labs before cleared to be discharged. Kick count sheet given to pt and explained. Pt stated she plans to transfer care to Mateusz Foster. Dr. Mateusz Burns number given for office. Dr. Mateusz Burns notified of pt desire to transfer care.

## 2023-10-23 NOTE — ED NOTES
Patient back from [facility], [address]  10/23/23 4771

## 2023-10-23 NOTE — ED NOTES
OB called and based on their labs she is cleared from their [address], [address] [facility] [address]  10/23/23 2301

## 2023-10-23 NOTE — FLOWSHEET NOTE
Dr. Alix ePtit viewed labs. Order to be discharged from OB stand point. Spoke with Yael Helm RN in ED and informed of OB cleared from her stand point.

## 2023-10-23 NOTE — FLOWSHEET NOTE
Pt arrived to triage. Pt complain of abd pain. Pt reports fetal movement. Pt placed on EFM. Pt oriented to room and call light. Call light in reach. RN remains at bedside for assessment.

## 2023-10-25 LAB — URATE SERPL-MCNC: 2.8 MG/DL (ref 2.6–6)

## (undated) DEVICE — TRAY SKIN PREP GELWITH 2 SPNG

## (undated) DEVICE — PACK PROCEDURE SURG VAG DEL REV

## (undated) DEVICE — CANISTER, RIGID, 1200CC: Brand: MEDLINE INDUSTRIES, INC.

## (undated) DEVICE — GLOVE,SURG,SENSICARE SLT,LF,PF,6.5: Brand: MEDLINE

## (undated) DEVICE — Z DISCONTINUED USE 2659140 CANNULA VAC DIA8MM STR SEMI RIG ROUNDED TIP DISP BERK

## (undated) DEVICE — SET COLL TBNG L6FT DIA3/8IN W/ INTEGR SWVL HNDL SLIP RNG M

## (undated) DEVICE — JELLY LUBRICATING 2.7GM H2O SOL GREASELESS E-Z

## (undated) DEVICE — COVER US PRB W13XL244CM SURGICAL INTRAOPERATIVE W RUBBERBAND

## (undated) DEVICE — HOSE CONN L18IN UTER DISP FOR BERK SAFETOUCH SYS

## (undated) DEVICE — CURETTE SURG VAC 11 MM CRV RIGID PLAS

## (undated) DEVICE — SPONGE LAP W18XL18IN WHT COT 4 PLY FLD STRUNG RADPQ DISP ST

## (undated) DEVICE — CATHETER,URETHRAL,VINYL,MALE,16",16 FR: Brand: MEDLINE

## (undated) DEVICE — PAD,NON-ADHERENT,3X8,STERILE,LF,1/PK: Brand: MEDLINE

## (undated) DEVICE — TRAP TISS DISP FOR COLL SYS BERK SAFETOUCH

## (undated) DEVICE — COVER LT HNDL BLU PLAS

## (undated) DEVICE — SOLUTION IV IRRIG WATER 500ML POUR BRL ST 2F7113